# Patient Record
Sex: FEMALE | Race: WHITE | NOT HISPANIC OR LATINO | Employment: OTHER | URBAN - NONMETROPOLITAN AREA
[De-identification: names, ages, dates, MRNs, and addresses within clinical notes are randomized per-mention and may not be internally consistent; named-entity substitution may affect disease eponyms.]

---

## 2021-08-25 ENCOUNTER — OFFICE VISIT (OUTPATIENT)
Dept: CARDIOLOGY | Facility: CLINIC | Age: 71
End: 2021-08-25

## 2021-08-25 VITALS
TEMPERATURE: 96.9 F | WEIGHT: 248 LBS | HEART RATE: 70 BPM | OXYGEN SATURATION: 96 % | SYSTOLIC BLOOD PRESSURE: 150 MMHG | BODY MASS INDEX: 36.73 KG/M2 | HEIGHT: 69 IN | DIASTOLIC BLOOD PRESSURE: 79 MMHG

## 2021-08-25 DIAGNOSIS — R60.9 PERIPHERAL EDEMA: ICD-10-CM

## 2021-08-25 DIAGNOSIS — R06.02 SHORTNESS OF BREATH: ICD-10-CM

## 2021-08-25 DIAGNOSIS — R09.89 DECREASED PEDAL PULSES: ICD-10-CM

## 2021-08-25 DIAGNOSIS — I10 ESSENTIAL HYPERTENSION: ICD-10-CM

## 2021-08-25 DIAGNOSIS — I73.9 CLAUDICATION (HCC): ICD-10-CM

## 2021-08-25 DIAGNOSIS — R42 DIZZINESS: ICD-10-CM

## 2021-08-25 DIAGNOSIS — R00.2 PALPITATIONS: ICD-10-CM

## 2021-08-25 DIAGNOSIS — R07.89 OTHER CHEST PAIN: Primary | ICD-10-CM

## 2021-08-25 DIAGNOSIS — I25.119 CORONARY ARTERY DISEASE INVOLVING NATIVE CORONARY ARTERY OF NATIVE HEART WITH ANGINA PECTORIS (HCC): ICD-10-CM

## 2021-08-25 PROBLEM — J44.9 COPD (CHRONIC OBSTRUCTIVE PULMONARY DISEASE): Status: ACTIVE | Noted: 2021-08-25

## 2021-08-25 PROBLEM — R60.0 PERIPHERAL EDEMA: Status: ACTIVE | Noted: 2021-08-25

## 2021-08-25 PROBLEM — M19.90 ARTHRITIS: Status: ACTIVE | Noted: 2021-08-25

## 2021-08-25 PROBLEM — J30.2 SEASONAL ALLERGIES: Status: ACTIVE | Noted: 2021-08-25

## 2021-08-25 PROCEDURE — 99204 OFFICE O/P NEW MOD 45 MIN: CPT | Performed by: NURSE PRACTITIONER

## 2021-08-25 PROCEDURE — 93000 ELECTROCARDIOGRAM COMPLETE: CPT | Performed by: NURSE PRACTITIONER

## 2021-08-25 RX ORDER — ASPIRIN 81 MG/1
81 TABLET ORAL DAILY
Qty: 30 TABLET | Refills: 11 | Status: SHIPPED | OUTPATIENT
Start: 2021-08-25

## 2021-08-25 RX ORDER — ATORVASTATIN CALCIUM 20 MG/1
20 TABLET, FILM COATED ORAL DAILY
Qty: 90 TABLET | Refills: 3 | Status: SHIPPED | OUTPATIENT
Start: 2021-08-25 | End: 2021-08-25 | Stop reason: SDUPTHER

## 2021-08-25 RX ORDER — OMEPRAZOLE 20 MG/1
20 CAPSULE, DELAYED RELEASE ORAL DAILY
COMMUNITY

## 2021-08-25 RX ORDER — SPIRONOLACTONE 50 MG/1
50 TABLET, FILM COATED ORAL DAILY
COMMUNITY

## 2021-08-25 RX ORDER — ISOSORBIDE MONONITRATE 30 MG/1
30 TABLET, EXTENDED RELEASE ORAL DAILY
Qty: 30 TABLET | Refills: 11 | Status: SHIPPED | OUTPATIENT
Start: 2021-08-25 | End: 2021-08-25 | Stop reason: SDUPTHER

## 2021-08-25 RX ORDER — ATENOLOL 100 MG/1
100 TABLET ORAL DAILY
COMMUNITY

## 2021-08-25 RX ORDER — NITROGLYCERIN 0.4 MG/1
TABLET SUBLINGUAL
Qty: 30 TABLET | Refills: 5 | Status: SHIPPED | OUTPATIENT
Start: 2021-08-25

## 2021-08-25 RX ORDER — ISOSORBIDE MONONITRATE 30 MG/1
15 TABLET, EXTENDED RELEASE ORAL NIGHTLY
Qty: 30 TABLET | Refills: 11 | Status: SHIPPED | OUTPATIENT
Start: 2021-08-25

## 2021-08-25 RX ORDER — LISINOPRIL 40 MG/1
40 TABLET ORAL DAILY
COMMUNITY

## 2021-08-25 RX ORDER — ATORVASTATIN CALCIUM 20 MG/1
20 TABLET, FILM COATED ORAL DAILY
Qty: 30 TABLET | Refills: 11 | Status: SHIPPED | OUTPATIENT
Start: 2021-08-25

## 2021-08-25 RX ORDER — FUROSEMIDE 20 MG/1
20 TABLET ORAL 2 TIMES DAILY
COMMUNITY

## 2021-08-25 RX ORDER — ASPIRIN 81 MG/1
81 TABLET ORAL DAILY
Qty: 90 TABLET | Refills: 3 | Status: SHIPPED | OUTPATIENT
Start: 2021-08-25 | End: 2021-08-25 | Stop reason: SDUPTHER

## 2021-08-25 RX ORDER — NITROGLYCERIN 0.4 MG/1
TABLET SUBLINGUAL
Qty: 30 TABLET | Refills: 5 | Status: SHIPPED | OUTPATIENT
Start: 2021-08-25 | End: 2021-08-25 | Stop reason: SDUPTHER

## 2021-08-25 NOTE — PROGRESS NOTES
Subjective   Charu Michael is a 71 y.o. female     Chief Complaint   Patient presents with   • Establish Care       HPI    Problem list:    1.  CAD   1.1 [facility] 2/26/2015-2030% narrowing at the junction of its proximal middle thirds of the LAD, the diagonal with scintigraphically normal, right coronary artery with angiographically normal, circumflex had minor irregularities, EF 55%, LVEDP 12-14  2.  Hypertension  3.  Palpitations  4.  Claudication  5.  Shortness of breath  6.  COPD, O2 2 L nasal cannula 24/7, Dr. Terry  7.  Dizziness  8.  History of hiatal hernia with repair  9.  Family history of early CAD, Mom 57 MI    Patient is a 71-year-old female who presents today to establish care for chest pain with her  at her side.  Patient says she has what she describes as a sharp pain and then tightness and heaviness midsternum.  She says it does not radiate.  She says it can last for up to a couple hours and it is sporadic when it comes.  She says has been going on for very long time.  Patient will get short of breath, nauseated and lightheaded to the point where she feels like she is going to pass out whenever it occurs.  Patient says that she has fluttering she can just be sitting and it will start to race.  She gets it makes her have a nervous feeling.  She says that she does have dizziness and lightheadedness if she gets up too quickly or if she has been standing for a few minutes.  She denies any syncope, orthopnea or PND.  She does have swelling in her legs and feels like her water pills do not take it all the way.  Patient is shortness of breath she said since January is gotten worse.  She says that she does use oxygen 24/7 and she has for 6 to 7 years.  She says however ever since January she has been unable to do her housework without having to stop and rest.  She says she will get very short of breath and fatigue this is new for her.  Patient did have blood work done at [facility] and her  troponin was negative.  This was on Aug 23.    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    Current Outpatient Medications on File Prior to Visit   Medication Sig Dispense Refill   • atenolol (TENORMIN) 100 MG tablet Take 100 mg by mouth Daily.     • fluticasone-salmeterol (ADVAIR) 250-50 MCG/DOSE DISKUS Inhale 2 puffs 2 (Two) Times a Day.     • furosemide (LASIX) 20 MG tablet Take 20 mg by mouth 2 (Two) Times a Day.     • lisinopril (PRINIVIL,ZESTRIL) 40 MG tablet Take 40 mg by mouth Daily.     • omeprazole (priLOSEC) 20 MG capsule Take 20 mg by mouth Daily.     • spironolactone (ALDACTONE) 50 MG tablet Take 50 mg by mouth Daily.       No current facility-administered medications on file prior to visit.       ALLERGIES    Norco [hydrocodone-acetaminophen]    Past Medical History:   Diagnosis Date   • Acid reflux    • Arthritis    • Asthma    • COPD (chronic obstructive pulmonary disease) (CMS/formerly [facility])    • COVID-19 vaccine administered 2021    2nd- 2021 - Moderna    • Hypertension    • Tachycardia        Social History     Socioeconomic History   • Marital status:      Spouse name: Not on file   • Number of children: Not on file   • Years of education: Not on file   • Highest education level: Not on file   Tobacco Use   • Smoking status: Former Smoker     Packs/day: 1.00     Years: 20.00     Pack years: 20.00     Types:  Cigarettes     Quit date: 3/25/2013     Years since quittin.4   • Smokeless tobacco: Never Used   Substance and Sexual Activity   • Alcohol use: Never   • Drug use: Defer   • Sexual activity: Defer       Family History   Problem Relation Age of Onset   • Heart attack Mother    • Hypertension Mother    • Hyperlipidemia Mother    • Heart attack Father    • Hypertension Father    • Hyperlipidemia Father        Review of Systems   Constitutional: Positive for fatigue (tired all the time). Negative for appetite change, chills, diaphoresis and fever.   HENT: Positive for congestion (nasal due to allergies ) and rhinorrhea (clear at times due to allergies ). Negative for sore throat.    Eyes: Positive for visual disturbance (glasses ( reading) ).   Respiratory: Positive for chest tightness (Center of the chest ( tight or feels heavy ) at times when she is cleaning and moving around ), shortness of breath (walking at any distance even with O2, worse since , could walk around and clean house and now she has to do a section adn stop, etc; with CP  ) and wheezing (worse at night she cant lay flat down she has to be elevated up ). Negative for cough.    Cardiovascular: Positive for chest pain (sharp pain )/tightness/heaviness mid no rad; up to couple of hours;  sporadic, can occur at anytime; been going on for a very long time , palpitations (fluttering; just sitting at times and will start racing, makes her feel nervous ) and leg swelling (legs, feet , and ankles , the swelling does not go down at night; water pills do not work ).   Gastrointestinal: Positive for constipation (when she takes certain medication ), diarrhea (Chronic ( it just happens ) all the time ) and nausea (with CP ). Negative for abdominal pain, blood in stool and vomiting.   Endocrine: Positive for cold intolerance (cold sweats ) and heat intolerance (Night sweats and Hot flahses ).   Genitourinary: Negative for difficulty urinating, dysuria,  "frequency, hematuria and urgency.   Musculoskeletal: Positive for arthralgias (through out he rentire body ), back pain (lower ), gait problem (wheelchair ), joint swelling (ankles and knees ) and neck pain (back of her neck and will run down her shoulders ). Negative for neck stiffness.   Skin: Negative for color change, pallor, rash and wound.   Allergic/Immunologic: Positive for environmental allergies (Seasonal ). Negative for food allergies.   Neurological: Positive for dizziness (she can be walking and sitting and she will have dizzy spells; on occasion with position change ), weakness (weakness all over her body , her legs start giving out when she walks for a few mins ), light-headedness (when she gets up to quick she has to stand still for a few mins and get her balance; will feel like she is going to pass out when she has CP on occasion ), numbness (legs, and feet stay numb ) and headaches (H/O of Headaches ).   Hematological: Does not bruise/bleed easily.   Psychiatric/Behavioral: Positive for sleep disturbance (Hard to go and hard to stay asleep , she tosses and turns and she can not lay flat she has to be elevated to get rest she gets maybe 3-4 hrs a night ).       Objective   /79 (BP Location: Right arm)   Pulse 70   Temp 96.9 °F (36.1 °C)   Ht 175.3 cm (69\")   Wt 112 kg (248 lb)   SpO2 96% Comment: 2 lts  BMI 36.62 kg/m²   Vitals:    08/25/21 0938   BP: 150/79   BP Location: Right arm   Pulse: 70   Temp: 96.9 °F (36.1 °C)   SpO2: 96%   Weight: 112 kg (248 lb)   Height: 175.3 cm (69\")      Lab Results (most recent)     None        Physical Exam  Vitals reviewed.   Constitutional:       General: She is awake.      Appearance: She is well-developed and well-groomed. She is obese. She is ill-appearing.   HENT:      Head: Normocephalic.   Eyes:      General: Lids are normal.   Neck:      Vascular: No carotid bruit, hepatojugular reflux or JVD.   Cardiovascular:      Rate and Rhythm: Normal rate " and regular rhythm.      Pulses:           Radial pulses are 2+ on the right side and 2+ on the left side.        Dorsalis pedis pulses are 2+ on the right side.        Posterior tibial pulses are 2+ on the right side.      Heart sounds: Normal heart sounds.      Comments: LLE decreased pedal pulses   Pulmonary:      Effort: Pulmonary effort is normal.      Breath sounds: Normal breath sounds and air entry.      Comments: O2 2L NC  Abdominal:      General: Bowel sounds are normal.      Palpations: Abdomen is soft.   Musculoskeletal:      Right lower leg: No edema.      Left lower leg: No edema.      Comments: Wheelchair   Skin:     General: Skin is warm and dry.   Neurological:      Mental Status: She is alert and oriented to person, place, and time.   Psychiatric:         Attention and Perception: Attention and perception normal.         Mood and Affect: Mood and affect normal.         Speech: Speech normal.         Behavior: Behavior normal. Behavior is cooperative.         Thought Content: Thought content normal.         Cognition and Memory: Cognition and memory normal.         Judgment: Judgment normal.         Procedure     ECG 12 Lead    Date/Time: 8/25/2021 12:01 PM  Performed by: Radha Henderson APRN  Authorized by: Radha Henderson APRN   Comparison: not compared with previous ECG   Rhythm: sinus rhythm  Rate: normal  BPM: 73  QRS axis: normal  Other findings: non-specific ST-T wave changes    Clinical impression: non-specific ECG                 Assessment/Plan      Diagnosis Plan   1. Other chest pain  Stress Test With Myocardial Perfusion One Day    Adult Transthoracic Echo Complete W/ Cont if Necessary Per Protocol    isosorbide mononitrate (IMDUR) 30 MG 24 hr tablet    nitroglycerin (NITROSTAT) 0.4 MG SL tablet    ECG 12 Lead   2. Coronary artery disease involving native coronary artery of native heart with angina pectoris (CMS/HCC)  Stress Test With Myocardial Perfusion One Day    Adult  Transthoracic Echo Complete W/ Cont if Necessary Per Protocol    atorvastatin (LIPITOR) 20 MG tablet    aspirin (aspirin) 81 MG EC tablet    ECG 12 Lead   3. Essential hypertension  Stress Test With Myocardial Perfusion One Day    Adult Transthoracic Echo Complete W/ Cont if Necessary Per Protocol    ECG 12 Lead   4. Palpitations  Stress Test With Myocardial Perfusion One Day    Adult Transthoracic Echo Complete W/ Cont if Necessary Per Protocol    Cardiac Event Monitor    ECG 12 Lead   5. Peripheral edema  Adult Transthoracic Echo Complete W/ Cont if Necessary Per Protocol   6. Dizziness  Cardiac Event Monitor    Duplex Carotid Ultrasound CAR   7. Shortness of breath  Stress Test With Myocardial Perfusion One Day    Adult Transthoracic Echo Complete W/ Cont if Necessary Per Protocol   8. Claudication (CMS/HCC)  Duplex Lower Extremity Art / Grafts - Bilateral CAR   9. Decreased pedal pulses  Duplex Lower Extremity Art / Grafts - Bilateral CAR       Return in about 12 weeks (around 11/17/2021).    Chest pain/CAD/hypertension/palpitation/peripheral edema/shortness of breath-patient have an ischemia work-up, stress and echo.  She will start Imdur a half a tab a day.  She will start aspirin 81.  She will start Lipitor.  She will use nitroglycerin as needed for chest pain no resolution she will go to the ER.  Dizziness-patient will carotid ultrasound.  Palpitations/dizziness-patient wore an event monitor for 2 weeks.  Claudication/decreased pedal pulses-patient on bilateral x-ray arterial ultrasound.  She will continue her medication regimen with above-noted changes.  She will follow-up in 12 weeks or sooner if any changes or abnormalities with testing.    I have requested that her stress test be done as soon as possible.       Charu Michael  reports that she quit smoking about 8 years ago. Her smoking use included cigarettes. She has a 20.00 pack-year smoking history. She has never used smokeless tobacco..Advance Care  Planning   ACP discussion was declined by the patient. Patient does not have an advance directive, declines further assistance. Patient brought in medicine list to appointment, it's been reviewed with patient and med list was updated in the chart.     Electronically signed by:

## 2021-08-25 NOTE — PATIENT INSTRUCTIONS
Advance Care Planning and Advance Directives     You make decisions on a daily basis - decisions about where you want to live, your career, your home, your life. Perhaps one of the most important decisions you face is your choice for future medical care. Take time to talk with your family and your healthcare team and start planning today.  Advance Care Planning is a process that can help you:  · Understand possible future healthcare decisions in light of your own experiences  · Reflect on those decision in light of your goals and values  · Discuss your decisions with those closest to you and the healthcare professionals that care for you  · Make a plan by creating a document that reflects your wishes    Surrogate Decision Maker  In the event of a medical emergency, which has left you unable to communicate or to make your own decisions, you would need someone to make decisions for you.  It is important to discuss your preferences for medical treatment with this person while you are in good health.     Qualities of a surrogate decision maker:  • Willing to take on this role and responsibility  • Knows what you want for future medical care  • Willing to follow your wishes even if they don't agree with them  • Able to make difficult medical decisions under stressful circumstances    Advance Directives  These are legal documents you can create that will guide your healthcare team and decision maker(s) when needed. These documents can be stored in the electronic medical record.    · Living Will - a legal document to guide your care if you have a terminal condition or a serious illness and are unable to communicate. States vary by statute in document names/types, but most forms may include one or more of the following:        -  Directions regarding life-prolonging treatments        -  Directions regarding artificially provided nutrition/hydration        -  Choosing a healthcare decision maker        -  Direction  regarding organ/tissue donation    · Durable Power of  for Healthcare - this document names an -in-fact to make medical decisions for you, but it may also allow this person to make personal and financial decisions for you. Please seek the advice of an  if you need this type of document.    **Advance Directives are not required and no one may discriminate against you if you do not sign one.    Medical Orders  Many states allow specific forms/orders signed by your physician to record your wishes for medical treatment in your current state of health. This form, signed in personal communication with your physician, addresses resuscitation and other medical interventions that you may or may not want.        Coronary Artery Disease, Female  Coronary artery disease (CAD) is a condition in which the arteries that lead to the heart (coronary arteries) become narrow or blocked. The narrowing or blockage can lead to decreased blood flow to the heart. Prolonged reduced blood flow can cause a heart attack (myocardial infarction or MI). This condition may also be called coronary heart disease.  Because CAD is the leading cause of death in women, it is important to understand what causes this condition and how it is treated.  What are the causes?  CAD is most often caused by atherosclerosis. This is the buildup of fat and cholesterol (plaque) on the inside of the arteries. Over time, the plaque may narrow or block the artery, reducing blood flow to the heart. Plaque can also become weak and break off within a coronary artery and cause a sudden blockage. Other less common causes of CAD include:  · A blood clot or a piece of a blood clot or other substance that blocks the flow of blood in a coronary artery (embolism).  · A tearing of the artery (spontaneous coronary artery dissection).  · An enlargement of an artery (aneurysm).  · Inflammation (vasculitis) in the artery wall.  What increases the risk?  The  following factors may make you more likely to develop this condition:  · Age. Women over age 55 are at a greater risk of CAD.  · Family history of CAD.  · High blood pressure (hypertension).  · Diabetes.  · High cholesterol levels.  · Tobacco use.  · Lack of exercise.  · Menopause.  ? All postmenopausal women are at greater risk of CAD.  ? Women who have experienced menopause between the ages of 40-45 (early menopause) are at a higher risk of CAD.  ? Women who have experienced menopause before age 40 (premature menopause) are at a very high risk of CAD.  · Excessive alcohol use.  · A diet high in saturated and trans fats, such as fried food and processed meat.  Other possible risk factors include:  · High stress levels.  · Depression.  · Obesity.  · Sleep apnea.  What are the signs or symptoms?  Many people do not have any symptoms during the early stages of CAD. As the condition progresses, symptoms may include:  · Chest pain (angina). The pain can:  ? Feel like crushing or squeezing, or like a tightness, pressure, fullness, or heaviness in the chest.  ? Last more than a few minutes or can stop and recur. The pain tends to get worse with exercise or stress and to fade with rest.  · Pain in the arms, neck, jaw, ear, or back.  · Unexplained heartburn or indigestion.  · Shortness of breath.  · Nausea.  · Sudden cold sweats.  · Sudden light-headedness.  · Fluttering or fast heartbeat (palpitations).  Many women have chest discomfort and the other symptoms. However, women often have unusual (atypical) symptoms, such as:  · Fatigue.  · Vomiting.  · Unexplained feelings of nervousness or anxiety.  · Unexplained weakness.  · Dizziness or fainting.  How is this diagnosed?  This condition is diagnosed based on:  · Your family and medical history.  · A physical exam.  · Tests, including:  ? A test to check the electrical signals in your heart (electrocardiogram).  ? Exercise stress test. This looks for signs of blockage when  the heart is stressed with exercise, such as running on a treadmill.  ? Pharmacologic stress test. This test looks for signs of blockage when the heart is being stressed with a medicine.  ? Blood tests.  ? Coronary angiogram. This is a procedure to look at the coronary arteries to see if there is any blockage. During this test, a dye is injected into your arteries so they appear on an X-ray.  ? Coronary artery CT scan. This CT scan helps detect calcium deposits in your coronary arteries. Calcium deposits are an indicator of CAD.  ? A test that uses sound waves to take a picture of your heart (echocardiogram).  ? Chest X-ray.  How is this treated?  This condition may be treated by:  · Healthy lifestyle changes to reduce risk factors.  · Medicines such as:  ? Antiplatelet medicines and blood-thinning medicines, such as aspirin. These help to prevent blood clots.  ? Nitroglycerin.  ? Blood pressure medicines.  ? Cholesterol-lowering medicine.  · Coronary angioplasty and stenting. During this procedure, a thin, flexible tube is inserted through a blood vessel and into a blocked artery. A balloon or similar device on the end of the tube is inflated to open up the artery. In some cases, a small, mesh tube (stent) is inserted into the artery to keep it open.  · Coronary artery bypass surgery. During this surgery, veins or arteries from other parts of the body are used to create a bypass around the blockage and allow blood to reach your heart.  Follow these instructions at home:  Medicines  · Take over-the-counter and prescription medicines only as told by your health care provider.  · Do not take the following medicines unless your health care provider approves:  ? NSAIDs, such as ibuprofen, naproxen, or celecoxib.  ? Vitamin supplements that contain vitamin A, vitamin E, or both.  ? Hormone replacement therapy that contains estrogen with or without progestin.  Lifestyle  · Follow an exercise program approved by your  health care provider. Aim for 150 minutes of moderate exercise or 75 minutes of vigorous exercise each week.  · Maintain a healthy weight or lose weight as approved by your health care provider.  · Learn to manage stress or try to limit your stress. Ask your health care provider for suggestions if you need help.  · Get screened for depression and seek treatment, if needed.  · Do not use any products that contain nicotine or tobacco, such as cigarettes, e-cigarettes, and chewing tobacco. If you need help quitting, ask your health care provider.  · Do not use illegal drugs.  Eating and drinking    · Follow a heart-healthy diet. A dietitian can help educate you about healthy food options and changes. In general, eat plenty of fruits and vegetables, lean meats, and whole grains.  · Avoid foods high in:  ? Sugar.  ? Salt (sodium).  ? Saturated fats, such as processed or fatty meat.  ? Trans fats, such as fried food.  · Use healthy cooking methods such as roasting, grilling, broiling, baking, poaching, steaming, or stir-frying.  · Do not drink alcohol if:  ? Your health care provider tells you not to drink.  ? You are pregnant, may be pregnant, or are planning to become pregnant.  · If you drink alcohol:  ? Limit how much you have to 0-1 drink a day.  ? Be aware of how much alcohol is in your drink. In the [place] one drink equals one 12 oz bottle of beer (355 mL), one 5 oz glass of wine (148 mL), or one 1½ oz glass of hard liquor (44 mL).  General instructions  · Manage any other health conditions, such as hypertension and diabetes. These conditions affect your heart.  · Your health care provider may ask you to monitor your blood pressure. Ideally, your blood pressure should be below 130/80.  · Keep all follow-up visits as told by your health care provider. This is important.  Get help right away if:  · You have pain in your chest, neck, ear, arm, jaw, stomach, or back that:  ? Lasts more than a few minutes.  ? Is  recurring.  ? Is not relieved by taking medicine under your tongue (sublingual nitroglycerin).  · You have profuse sweating without cause.  · You have unexplained:  ? Heartburn or indigestion.  ? Shortness of breath or difficulty breathing.  ? Fluttering or fast heartbeat (palpitations).  ? Nausea or vomiting.  ? Fatigue.  ? Feelings of nervousness or anxiety.  ? Weakness.  ? Diarrhea.  · You have sudden light-headedness or dizziness.  · You faint.  · You feel like hurting yourself or think about taking your own life.  These symptoms may represent a serious problem that is an emergency. Do not wait to see if the symptoms will go away. Get medical help right away. Call your local emergency services (911 in the [place] Do not drive yourself to the hospital.  Summary  · Coronary artery disease (CAD) is a condition in which the arteries that lead to the heart (coronary arteries) become narrow or blocked. The narrowing or blockage can lead to a heart attack.  · Many women have chest discomfort and other common symptoms of CAD. However, women often have unusual (atypical) symptoms, such as fatigue, vomiting, weakness, or dizziness.  · CAD can be treated with lifestyle changes, medicines, surgery, or a combination of these treatments.  This information is not intended to replace advice given to you by your health care provider. Make sure you discuss any questions you have with your health care provider.  Document Revised: 09/06/2019 Document Reviewed: 08/27/2019  "[employer] Patient Education © 2021 "[employer] Inc.    Nonspecific Chest Pain  Chest pain can be caused by many different conditions. Some causes of chest pain can be life-threatening. These will require treatment right away. Serious causes of chest pain include:  · Heart attack.  · A tear in the body's main blood vessel.  · Redness and swelling (inflammation) around your heart.  · Blood clot in your lungs.  Other causes of chest pain may not be so serious. These  include:  · Heartburn.  · Anxiety or stress.  · Damage to bones or muscles in your chest.  · Lung infections.  Chest pain can feel like:  · Pain or discomfort in your chest.  · Crushing, pressure, aching, or squeezing pain.  · Burning or tingling.  · Dull or sharp pain that is worse when you move, cough, or take a deep breath.  · Pain or discomfort that is also felt in your back, neck, jaw, shoulder, or arm, or pain that spreads to any of these areas.  It is hard to know whether your pain is caused by something that is serious or something that is not so serious. So it is important to see your doctor right away if you have chest pain.  Follow these instructions at home:  Medicines  · Take over-the-counter and prescription medicines only as told by your doctor.  · If you were prescribed an antibiotic medicine, take it as told by your doctor. Do not stop taking the antibiotic even if you start to feel better.  Lifestyle    · Rest as told by your doctor.  · Do not use any products that contain nicotine or tobacco, such as cigarettes, e-cigarettes, and chewing tobacco. If you need help quitting, ask your doctor.  · Do not drink alcohol.  · Make lifestyle changes as told by your doctor. These may include:  ? Getting regular exercise. Ask your doctor what activities are safe for you.  ? Eating a heart-healthy diet. A diet and nutrition specialist (dietitian) can help you to learn healthy eating options.  ? Staying at a healthy weight.  ? Treating diabetes or high blood pressure, if needed.  ? Lowering your stress. Activities such as yoga and relaxation techniques can help.  General instructions  · Pay attention to any changes in your symptoms. Tell your doctor about them or any new symptoms.  · Avoid any activities that cause chest pain.  · Keep all follow-up visits as told by your doctor. This is important. You may need more testing if your chest pain does not go away.  Contact a doctor if:  · Your chest pain does not go  away.  · You feel depressed.  · You have a fever.  Get help right away if:  · Your chest pain is worse.  · You have a cough that gets worse, or you cough up blood.  · You have very bad (severe) pain in your belly (abdomen).  · You pass out (faint).  · You have either of these for no clear reason:  ? Sudden chest discomfort.  ? Sudden discomfort in your arms, back, neck, or jaw.  · You have shortness of breath at any time.  · You suddenly start to sweat, or your skin gets clammy.  · You feel sick to your stomach (nauseous).  · You throw up (vomit).  · You suddenly feel lightheaded or dizzy.  · You feel very weak or tired.  · Your heart starts to beat fast, or it feels like it is skipping beats.  These symptoms may be an emergency. Do not wait to see if the symptoms will go away. Get medical help right away. Call your local emergency services (911 in the [place] Do not drive yourself to the hospital.  Summary  · Chest pain can be caused by many different conditions. The cause may be serious and need treatment right away. If you have chest pain, see your doctor right away.  · Follow your doctor's instructions for taking medicines and making lifestyle changes.  · Keep all follow-up visits as told by your doctor. This includes visits for any further testing if your chest pain does not go away.  · Be sure to know the signs that show that your condition has become worse. Get help right away if you have these symptoms.  This information is not intended to replace advice given to you by your health care provider. Make sure you discuss any questions you have with your health care provider.  Document Revised: 06/20/2019 Document Reviewed: 06/20/2019  [employer] Patient Education © 2021 [employer]  For more information or to schedule a time with a [facility] Advance Care Planning Facilitator contact: [email or web address] or call [phone] and someone will contact you directly.

## 2021-08-26 ENCOUNTER — HOSPITAL ENCOUNTER (OUTPATIENT)
Dept: CARDIOLOGY | Facility: HOSPITAL | Age: 71
Discharge: HOME | End: 2021-08-26

## 2021-08-26 ENCOUNTER — TELEPHONE (OUTPATIENT)
Dept: CARDIOLOGY | Facility: CLINIC | Age: 71
End: 2021-08-26

## 2021-08-26 VITALS — WEIGHT: 246.91 LBS | HEIGHT: 69 IN | BODY MASS INDEX: 36.57 KG/M2

## 2021-08-26 DIAGNOSIS — I25.119 CORONARY ARTERY DISEASE INVOLVING NATIVE CORONARY ARTERY OF NATIVE HEART WITH ANGINA PECTORIS (HCC): ICD-10-CM

## 2021-08-26 DIAGNOSIS — R07.89 OTHER CHEST PAIN: ICD-10-CM

## 2021-08-26 DIAGNOSIS — R60.9 PERIPHERAL EDEMA: ICD-10-CM

## 2021-08-26 DIAGNOSIS — R00.2 PALPITATIONS: ICD-10-CM

## 2021-08-26 DIAGNOSIS — R06.02 SHORTNESS OF BREATH: ICD-10-CM

## 2021-08-26 DIAGNOSIS — I10 ESSENTIAL HYPERTENSION: ICD-10-CM

## 2021-08-26 PROCEDURE — 78452 HT MUSCLE IMAGE SPECT MULT: CPT | Performed by: INTERNAL MEDICINE

## 2021-08-26 PROCEDURE — A9500 TC99M SESTAMIBI: HCPCS | Performed by: INTERNAL MEDICINE

## 2021-08-26 PROCEDURE — 0 TECHNETIUM SESTAMIBI: Performed by: INTERNAL MEDICINE

## 2021-08-26 PROCEDURE — 93017 CV STRESS TEST TRACING ONLY: CPT

## 2021-08-26 PROCEDURE — 93306 TTE W/DOPPLER COMPLETE: CPT | Performed by: INTERNAL MEDICINE

## 2021-08-26 PROCEDURE — 93018 CV STRESS TEST I&R ONLY: CPT | Performed by: INTERNAL MEDICINE

## 2021-08-26 PROCEDURE — 78452 HT MUSCLE IMAGE SPECT MULT: CPT

## 2021-08-26 PROCEDURE — 25010000002 DOBUTAMINE PER 250 MG: Performed by: INTERNAL MEDICINE

## 2021-08-26 PROCEDURE — 93306 TTE W/DOPPLER COMPLETE: CPT

## 2021-08-26 RX ORDER — DOBUTAMINE HYDROCHLORIDE 200 MG/100ML
10 INJECTION INTRAVENOUS
Status: COMPLETED | OUTPATIENT
Start: 2021-08-26 | End: 2021-08-26

## 2021-08-26 RX ADMIN — TECHNETIUM TC 99M SESTAMIBI 1 DOSE: 1 INJECTION INTRAVENOUS at 09:45

## 2021-08-26 RX ADMIN — DOBUTAMINE HYDROCHLORIDE 10 MCG/KG/MIN: 200 INJECTION INTRAVENOUS at 11:40

## 2021-08-26 RX ADMIN — TECHNETIUM TC 99M SESTAMIBI 1 DOSE: 1 INJECTION INTRAVENOUS at 11:41

## 2021-08-28 LAB
BH CV REST NUCLEAR ISOTOPE DOSE: 10 MCI
BH CV STRESS DOSE DOBUTAMINE STAGE 1: 10
BH CV STRESS DURATION MIN STAGE 1: 2
BH CV STRESS DURATION SEC STAGE 1: 0
BH CV STRESS NUCLEAR ISOTOPE DOSE: 30 MCI
BH CV STRESS PROTOCOL 1: NORMAL
BH CV STRESS RECOVERY BP: NORMAL MMHG
BH CV STRESS RECOVERY HR: 88 BPM
BH CV STRESS STAGE 1: 1
MAXIMAL PREDICTED HEART RATE: 150 BPM
PERCENT MAX PREDICTED HR: 82.67 %
STRESS BASELINE BP: NORMAL MMHG
STRESS BASELINE HR: 61 BPM
STRESS PERCENT HR: 97 %
STRESS POST PEAK BP: NORMAL MMHG
STRESS POST PEAK HR: 124 BPM
STRESS TARGET HR: 128 BPM

## 2021-08-29 LAB
AORTIC DIMENSIONLESS INDEX: 0.7 (DI)
BH CV ECHO MEAS - AO MAX PG (FULL): 4.8 MMHG
BH CV ECHO MEAS - AO MAX PG: 8.5 MMHG
BH CV ECHO MEAS - AO MEAN PG (FULL): 2 MMHG
BH CV ECHO MEAS - AO MEAN PG: 4 MMHG
BH CV ECHO MEAS - AO ROOT AREA (BSA CORRECTED): 1.4
BH CV ECHO MEAS - AO ROOT AREA: 8 CM^2
BH CV ECHO MEAS - AO ROOT DIAM: 3.2 CM
BH CV ECHO MEAS - AO V2 MAX: 146 CM/SEC
BH CV ECHO MEAS - AO V2 MEAN: 92.2 CM/SEC
BH CV ECHO MEAS - AO V2 VTI: 35.7 CM
BH CV ECHO MEAS - BSA(HAYCOCK): 2.4 M^2
BH CV ECHO MEAS - BSA: 2.3 M^2
BH CV ECHO MEAS - BZI_BMI: 36.6 KILOGRAMS/M^2
BH CV ECHO MEAS - BZI_METRIC_HEIGHT: 175.3 CM
BH CV ECHO MEAS - BZI_METRIC_WEIGHT: 112.5 KG
BH CV ECHO MEAS - EDV(CUBED): 128 ML
BH CV ECHO MEAS - EDV(TEICH): 120.5 ML
BH CV ECHO MEAS - EF(CUBED): 58.5 %
BH CV ECHO MEAS - EF(MOD-BP): 50 %
BH CV ECHO MEAS - EF(TEICH): 49.9 %
BH CV ECHO MEAS - EF_3D-VOL: 52 %
BH CV ECHO MEAS - ESV(CUBED): 53.2 ML
BH CV ECHO MEAS - ESV(TEICH): 60.4 ML
BH CV ECHO MEAS - FS: 25.4 %
BH CV ECHO MEAS - IVS/LVPW: 0.95
BH CV ECHO MEAS - IVSD: 1.1 CM
BH CV ECHO MEAS - LA DIMENSION: 4.3 CM
BH CV ECHO MEAS - LA/AO: 1.3
BH CV ECHO MEAS - LAT PEAK E' VEL: 5.2 CM/SEC
BH CV ECHO MEAS - LV IVRT: 0.13 SEC
BH CV ECHO MEAS - LV MASS(C)D: 203.3 GRAMS
BH CV ECHO MEAS - LV MASS(C)DI: 89.8 GRAMS/M^2
BH CV ECHO MEAS - LV MAX PG: 3.7 MMHG
BH CV ECHO MEAS - LV MEAN PG: 2 MMHG
BH CV ECHO MEAS - LV V1 MAX: 96.6 CM/SEC
BH CV ECHO MEAS - LV V1 MEAN: 58.9 CM/SEC
BH CV ECHO MEAS - LV V1 VTI: 23.8 CM
BH CV ECHO MEAS - LVIDD: 5 CM
BH CV ECHO MEAS - LVIDS: 3.8 CM
BH CV ECHO MEAS - LVPWD: 1.1 CM
BH CV ECHO MEAS - MED PEAK E' VEL: 4.8 CM/SEC
BH CV ECHO MEAS - MV A MAX VEL: 111 CM/SEC
BH CV ECHO MEAS - MV DEC SLOPE: 563 CM/SEC^2
BH CV ECHO MEAS - MV DEC TIME: 0.21 SEC
BH CV ECHO MEAS - MV E MAX VEL: 82 CM/SEC
BH CV ECHO MEAS - MV E/A: 0.74
BH CV ECHO MEAS - MV MAX PG: 5.3 MMHG
BH CV ECHO MEAS - MV MEAN PG: 3 MMHG
BH CV ECHO MEAS - MV P1/2T MAX VEL: 115 CM/SEC
BH CV ECHO MEAS - MV P1/2T: 59.8 MSEC
BH CV ECHO MEAS - MV V2 MAX: 115 CM/SEC
BH CV ECHO MEAS - MV V2 MEAN: 73.8 CM/SEC
BH CV ECHO MEAS - MV V2 VTI: 47.2 CM
BH CV ECHO MEAS - MVA P1/2T LCG: 1.9 CM^2
BH CV ECHO MEAS - MVA(P1/2T): 3.7 CM^2
BH CV ECHO MEAS - PA MAX PG (FULL): 1.3 MMHG
BH CV ECHO MEAS - PA MAX PG: 2.8 MMHG
BH CV ECHO MEAS - PA MEAN PG (FULL): 1 MMHG
BH CV ECHO MEAS - PA MEAN PG: 2 MMHG
BH CV ECHO MEAS - PA V2 MAX: 83.2 CM/SEC
BH CV ECHO MEAS - PA V2 MEAN: 57 CM/SEC
BH CV ECHO MEAS - PA V2 VTI: 26.6 CM
BH CV ECHO MEAS - PI END-D VEL: 89.8 CM/SEC
BH CV ECHO MEAS - RV MAX PG: 1.5 MMHG
BH CV ECHO MEAS - RV MEAN PG: 1 MMHG
BH CV ECHO MEAS - RV V1 MAX: 61.4 CM/SEC
BH CV ECHO MEAS - RV V1 MEAN: 34.4 CM/SEC
BH CV ECHO MEAS - RV V1 VTI: 14 CM
BH CV ECHO MEAS - RVDD: 3.4 CM
BH CV ECHO MEAS - SI(AO): 126.9 ML/M^2
BH CV ECHO MEAS - SI(CUBED): 33.1 ML/M^2
BH CV ECHO MEAS - SI(TEICH): 26.5 ML/M^2
BH CV ECHO MEAS - SV(AO): 287.1 ML
BH CV ECHO MEAS - SV(CUBED): 74.9 ML
BH CV ECHO MEAS - SV(TEICH): 60 ML
BH CV ECHO MEASUREMENTS AVERAGE E/E' RATIO: 16.4
MAXIMAL PREDICTED HEART RATE: 150 BPM
STRESS TARGET HR: 128 BPM

## 2021-08-30 ENCOUNTER — TELEPHONE (OUTPATIENT)
Dept: CARDIOLOGY | Facility: CLINIC | Age: 71
End: 2021-08-30

## 2021-08-30 NOTE — TELEPHONE ENCOUNTER
Stress:  1.  The patient was infused with dobutamine to heart rate of 124, systolic blood pressure of 210, a rate-pressure product of nearly 22,000.  The patient achieved 83% of age adjusted maximum predicted heart rate.     2.  No EKG evidence of ischemia.  No exercise-induced dysrhythmia.     3.  Poor quality scintigraphic images demonstrate no evidence of ischemia.     4.  Mildly depressed post-rest ejection fraction of 48% with mild global hypokinesis.     5.  No evidence of pharmacologically induced transient ischemic dilation or of increased lung uptake of radiopharmaceutical.      Scheduled pt for 9/2 at 3pm.       Called and informed pt of abn results and of appt opening on 9/2 at 3pm, she verbalized understanding and stated she would be here.

## 2021-08-30 NOTE — TELEPHONE ENCOUNTER
----- Message from RIGO Reich sent at 8/29/2021  3:22 PM EDT -----  Get patient in within next 2-4 weeks.

## 2021-08-30 NOTE — TELEPHONE ENCOUNTER
----- Message from RIGO Reich sent at 8/30/2021  6:19 AM EDT -----  Please advise patient.        PT was advised of Echo results :    ejection fraction is greater than or equal to 50%.  3D ejection fraction is 52%.     MYA Rodríguez

## 2021-09-02 ENCOUNTER — LAB (OUTPATIENT)
Dept: CARDIOLOGY | Facility: CLINIC | Age: 71
End: 2021-09-02

## 2021-09-02 ENCOUNTER — OFFICE VISIT (OUTPATIENT)
Dept: CARDIOLOGY | Facility: CLINIC | Age: 71
End: 2021-09-02

## 2021-09-02 VITALS
SYSTOLIC BLOOD PRESSURE: 148 MMHG | DIASTOLIC BLOOD PRESSURE: 68 MMHG | BODY MASS INDEX: 36.43 KG/M2 | WEIGHT: 246 LBS | HEIGHT: 69 IN | TEMPERATURE: 97.6 F | HEART RATE: 60 BPM | OXYGEN SATURATION: 95 %

## 2021-09-02 DIAGNOSIS — I49.3 PVC (PREMATURE VENTRICULAR CONTRACTION): ICD-10-CM

## 2021-09-02 DIAGNOSIS — R60.9 PERIPHERAL EDEMA: ICD-10-CM

## 2021-09-02 DIAGNOSIS — I51.89 GRADE I DIASTOLIC DYSFUNCTION: ICD-10-CM

## 2021-09-02 DIAGNOSIS — Z82.49 FAMILY HISTORY OF EARLY CAD: ICD-10-CM

## 2021-09-02 DIAGNOSIS — I10 ESSENTIAL HYPERTENSION: ICD-10-CM

## 2021-09-02 DIAGNOSIS — R07.89 OTHER CHEST PAIN: Primary | ICD-10-CM

## 2021-09-02 DIAGNOSIS — I49.1 PREMATURE ATRIAL COMPLEXES: ICD-10-CM

## 2021-09-02 DIAGNOSIS — I47.29 NSVT (NONSUSTAINED VENTRICULAR TACHYCARDIA) (HCC): ICD-10-CM

## 2021-09-02 DIAGNOSIS — I25.119 CORONARY ARTERY DISEASE INVOLVING NATIVE CORONARY ARTERY OF NATIVE HEART WITH ANGINA PECTORIS (HCC): ICD-10-CM

## 2021-09-02 DIAGNOSIS — R06.02 SHORTNESS OF BREATH: ICD-10-CM

## 2021-09-02 LAB
ANION GAP SERPL CALCULATED.3IONS-SCNC: 9.2 MMOL/L (ref 5–15)
BUN SERPL-MCNC: 11 MG/DL (ref 8–23)
BUN/CREAT SERPL: 14.7 (ref 7–25)
CALCIUM SPEC-SCNC: 9.4 MG/DL (ref 8.6–10.5)
CHLORIDE SERPL-SCNC: 99 MMOL/L (ref 98–107)
CO2 SERPL-SCNC: 33.8 MMOL/L (ref 22–29)
CREAT SERPL-MCNC: 0.75 MG/DL (ref 0.57–1)
DEPRECATED RDW RBC AUTO: 44.8 FL (ref 37–54)
ERYTHROCYTE [DISTWIDTH] IN BLOOD BY AUTOMATED COUNT: 12.8 % (ref 12.3–15.4)
GFR SERPL CREATININE-BSD FRML MDRD: 76 ML/MIN/1.73
GLUCOSE SERPL-MCNC: 104 MG/DL (ref 65–99)
HCT VFR BLD AUTO: 37.5 % (ref 34–46.6)
HGB BLD-MCNC: 11.6 G/DL (ref 12–15.9)
MCH RBC QN AUTO: 29.6 PG (ref 26.6–33)
MCHC RBC AUTO-ENTMCNC: 30.9 G/DL (ref 31.5–35.7)
MCV RBC AUTO: 95.7 FL (ref 79–97)
PLATELET # BLD AUTO: 266 10*3/MM3 (ref 140–450)
PMV BLD AUTO: 10.5 FL (ref 6–12)
POTASSIUM SERPL-SCNC: 3.6 MMOL/L (ref 3.5–5.2)
RBC # BLD AUTO: 3.92 10*6/MM3 (ref 3.77–5.28)
SODIUM SERPL-SCNC: 142 MMOL/L (ref 136–145)
WBC # BLD AUTO: 7.52 10*3/MM3 (ref 3.4–10.8)

## 2021-09-02 PROCEDURE — 80048 BASIC METABOLIC PNL TOTAL CA: CPT | Performed by: NURSE PRACTITIONER

## 2021-09-02 PROCEDURE — 36415 COLL VENOUS BLD VENIPUNCTURE: CPT

## 2021-09-02 PROCEDURE — 85027 COMPLETE CBC AUTOMATED: CPT | Performed by: NURSE PRACTITIONER

## 2021-09-02 PROCEDURE — 99214 OFFICE O/P EST MOD 30 MIN: CPT | Performed by: NURSE PRACTITIONER

## 2021-09-02 RX ORDER — CLOPIDOGREL BISULFATE 75 MG/1
75 TABLET ORAL DAILY
Qty: 30 TABLET | Refills: 11 | Status: SHIPPED | OUTPATIENT
Start: 2021-09-02 | End: 2021-10-05

## 2021-09-02 NOTE — PATIENT INSTRUCTIONS
"Premature Atrial Contraction    A premature atrial contraction (PAC) is a kind of irregular heartbeat (arrhythmia). It happens when the heart beats too early and then pauses before beating again.  The heart has four areas, or chambers. Normally, electrical signals spread across the heart and make all the chambers beat together. During a PAC, the upper chambers of the heart (atria) beat too early, before they have had time to fill with blood. The heartbeat pauses afterward so the heart can fill with blood for the next beat.  Sometimes PAC can be a warning sign of another type of arrhythmia called atrial fibrillation. Atrial fibrillation may allow blood to pool in the atria and form clots. If a clot travels to the brain, it can cause a stroke.  What are the causes?  The cause of this condition is often unknown. Sometimes, this condition may be caused by heart disease or injury to the heart.  What increases the risk?  You are more likely to develop this condition if:  · You are a child.  · You are an adult who is 50 years of age or older.  Episodes may be triggered by:  · Caffeine.  · Alcohol.  · Tobacco use.  · Stimulant drugs.  · Some medicines or supplements.  · Stress.  · Heart disease.  What are the signs or symptoms?  Symptoms of this condition include:  · A feeling that your heart skipped a beat. The first heartbeat after the \"skipped\" beat may feel more forceful.  · A feeling that your heart is fluttering.  How is this diagnosed?  This condition is diagnosed based on:  · Your symptoms.  · A physical exam. Your health care provider may listen to your heart.  · An electrocardiogram (ECG). This is a test that records the electrical impulses of the heart.  · An ambulatory cardiac monitor. This device records your heartbeats for 24 hours or more.  You may also have:  · An echocardiogram to check for any heart conditions. This is a type of imaging test that uses sound waves (ultrasound) to make images of your " "heart.  · Blood tests.  How is this treated?  Treatment depends on the frequency of your symptoms and other risk factors. Treatments may include:  · Medicines (beta-blockers).  · Catheter ablation. This is done to destroy the part of the heart tissue that sends abnormal signals.  In some cases, treatment may not be needed for this condition.  Follow these instructions at home:  Lifestyle  · Do not use any products that contain nicotine or tobacco, such as cigarettes, e-cigarettes, and chewing tobacco. If you need help quitting, ask your health care provider.  · Exercise regularly. Ask your health care provider what type of exercise is safe for you.  · Find healthy ways to manage stress.  · Try to get at least 7-9 hours of sleep each night, or as much as recommended by your health care provider.  Alcohol use  · Do not drink alcohol if:  ? Your health care provider tells you not to drink.  ? You are pregnant, may be pregnant, or are planning to become pregnant.  ? Alcohol triggers your episodes.  · If you drink alcohol:  ? Limit how much you use to:  § 0-1 drink a day for women.  § 0-2 drinks a day for men.  ? Be aware of how much alcohol is in your drink. In the [place] one drink equals one 12 oz bottle of beer (355 mL), one 5 oz glass of wine (148 mL), or one 1½ oz glass of hard liquor (44 mL).  General instructions  · Take over-the-counter and prescription medicines only as told by your health care provider.  · If caffeine triggers episodes, do not eat, drink, or use anything with caffeine in it.  · Keep all follow-up visits as told by your health care provider. This is important.  Contact a health care provider if:  · You feel your heart skipping beats.  · Your heart skips beats and you feel dizzy, light-headed, or very tired.  Get help right away if you have:  · Chest pain.  · Trouble breathing.  · Any symptoms of a stroke. \"BE FAST\" is an easy way to remember the main warning signs of a stroke.  ? B - Balance. " "Signs are dizziness, sudden trouble walking, or loss of balance.  ? E - Eyes. Signs are trouble seeing or a sudden change in vision.  ? F - Face. Signs are sudden weakness or numbness of the face, or the face or eyelid drooping on one side.  ? A - Arms. Signs are weakness or numbness in an arm. This happens suddenly and usually on one side of the body.  ? S - Speech. Signs are sudden trouble speaking, slurred speech, or trouble understanding what people say.  ? T - Time. Time to call emergency services. Write down what time symptoms started.  · Other signs of stroke, such as:  ? A sudden, severe headache with no known cause.  ? Nausea or vomiting.  ? Seizure.  These symptoms may represent a serious problem that is an emergency. Do not wait to see if the symptoms will go away. Get medical help right away. Call your local emergency services (911 in the [place] Do not drive yourself to the hospital.  Summary  · A premature atrial contraction (PAC) is a kind of irregular heartbeat (arrhythmia). It happens when the heart beats too early and then pauses before beating again.  · Treatment depends on your symptoms and whether you have other underlying heart conditions.  · Contact a health care provider if your heart skips beats and you feel dizzy, light-headed, or very tired.  · In some cases, this condition may lead to a stroke. \"BE FAST\" is an easy way to remember the warning signs of stroke. Get help right away if you have any of the \"BE FAST\" signs.  This information is not intended to replace advice given to you by your health care provider. Make sure you discuss any questions you have with your health care provider.  Document Revised: 09/12/2019 Document Reviewed: 09/12/2019  [employer] Patient Education © 2021 [employer]  Premature Ventricular Contraction    A premature ventricular contraction (PVC) is a common kind of irregular heartbeat (arrhythmia). These contractions are extra heartbeats that start in the " ventricles of the heart and occur too early in the normal sequence. During the PVC, the heart's normal electrical pathway is not used, so the beat is shorter and less effective. In most cases, these contractions come and go and do not require treatment.  What are the causes?  Common causes of the condition include:  · Smoking.  · Drinking alcohol.  · Certain medicines.  · Some illegal drugs.  · Stress.  · Caffeine.  Certain medical conditions can also cause PVCs:  · Heart failure.  · Heart attack, or coronary artery disease.  · Heart valve problems.  · Changes in minerals in the blood (electrolytes).  · Low blood oxygen levels or high carbon dioxide levels.  In many cases, the cause of this condition is not known.  What are the signs or symptoms?  The main symptom of this condition is fast or skipped heartbeats (palpitations). Other symptoms include:  · Chest pain.  · Shortness of breath.  · Feeling tired.  · Dizziness.  · Difficulty exercising.  In some cases, there are no symptoms.  How is this diagnosed?  This condition may be diagnosed based on:  · Your medical history.  · A physical exam. During the exam, the health care provider will check for irregular heartbeats.  · Tests, such as:  ? An ECG (electrocardiogram) to monitor the electrical activity of your heart.  ? An ambulatory cardiac monitor. This device records your heartbeats for 24 hours or more.  ? Stress tests to see how exercise affects your heart rhythm and blood supply.  ? An echocardiogram. This test uses sound waves (ultrasound) to produce an image of your heart.  ? An electrophysiology study (EPS). This test checks for electrical problems in your heart.  How is this treated?  Treatment for this condition depends on any underlying conditions, the type of PVCs that you are having, and how much the symptoms are interfering with your daily life.  Possible treatments include:  · Avoiding things that cause premature contractions (triggers). These  include caffeine and alcohol.  · Taking medicines if symptoms are severe or if the extra heartbeats are frequent.  · Getting treatment for underlying conditions that cause PVCs.  · Having an implantable cardioverter defibrillator (ICD), if you are at risk for a serious arrhythmia. The ICD is a small device that is inserted into your chest to monitor your heartbeat. When it senses an irregular heartbeat, it sends a shock to bring the heartbeat back to normal.  · Having a procedure to destroy the portion of the heart tissue that sends out abnormal signals (catheter ablation).  In some cases, no treatment is required.  Follow these instructions at home:  Lifestyle  · Do not use any products that contain nicotine or tobacco, such as cigarettes, e-cigarettes, and chewing tobacco. If you need help quitting, ask your health care provider.  · Do not use illegal drugs.  · Exercise regularly. Ask your health care provider what type of exercise is safe for you.  · Try to get at least 7-9 hours of sleep each night, or as much as recommended by your health care provider.  · Find healthy ways to manage stress. Avoid stressful situations when possible.  Alcohol use  · Do not drink alcohol if:  ? Your health care provider tells you not to drink.  ? You are pregnant, may be pregnant, or are planning to become pregnant.  ? Alcohol triggers your episodes.  · If you drink alcohol:  ? Limit how much you use to:  § 0-1 drink a day for women.  § 0-2 drinks a day for men.  · Be aware of how much alcohol is in your drink. In the [place] one drink equals one 12 oz bottle of beer (355 mL), one 5 oz glass of wine (148 mL), or one 1½ oz glass of hard liquor (44 mL).  General instructions  · Take over-the-counter and prescription medicines only as told by your health care provider.  · If caffeine triggers episodes of PVC, do not eat, drink, or use anything with caffeine in it.  · Keep all follow-up visits as told by your health care provider. This  is important.  Contact a health care provider if you:  · Feel palpitations.  Get help right away if you:  · Have chest pain.  · Have shortness of breath.  · Have sweating for no reason.  · Have nausea and vomiting.  · Become light-headed or you faint.  Summary  · A premature ventricular contraction (PVC) is a common kind of irregular heartbeat (arrhythmia).  · In most cases, these contractions come and go and do not require treatment.  · You may need to wear an ambulatory cardiac monitor. This records your heartbeats for 24 hours or more.  · Treatment depends on any underlying conditions, the type of PVCs that you are having, and how much the symptoms are interfering with your daily life.  This information is not intended to replace advice given to you by your health care provider. Make sure you discuss any questions you have with your health care provider.  Document Revised: 09/12/2019 Document Reviewed: 09/12/2019  [employer] Patient Education © 2021 [employer]    Advance Care Planning and Advance Directives     You make decisions on a daily basis - decisions about where you want to live, your career, your home, your life. Perhaps one of the most important decisions you face is your choice for future medical care. Take time to talk with your family and your healthcare team and start planning today.  Advance Care Planning is a process that can help you:  · Understand possible future healthcare decisions in light of your own experiences  · Reflect on those decision in light of your goals and values  · Discuss your decisions with those closest to you and the healthcare professionals that care for you  · Make a plan by creating a document that reflects your wishes    Surrogate Decision Maker  In the event of a medical emergency, which has left you unable to communicate or to make your own decisions, you would need someone to make decisions for you.  It is important to discuss your preferences for medical treatment  with this person while you are in good health.     Qualities of a surrogate decision maker:  • Willing to take on this role and responsibility  • Knows what you want for future medical care  • Willing to follow your wishes even if they don't agree with them  • Able to make difficult medical decisions under stressful circumstances    Advance Directives  These are legal documents you can create that will guide your healthcare team and decision maker(s) when needed. These documents can be stored in the electronic medical record.    · Living Will - a legal document to guide your care if you have a terminal condition or a serious illness and are unable to communicate. States vary by statute in document names/types, but most forms may include one or more of the following:        -  Directions regarding life-prolonging treatments        -  Directions regarding artificially provided nutrition/hydration        -  Choosing a healthcare decision maker        -  Direction regarding organ/tissue donation    · Durable Power of  for Healthcare - this document names an -in-fact to make medical decisions for you, but it may also allow this person to make personal and financial decisions for you. Please seek the advice of an  if you need this type of document.    **Advance Directives are not required and no one may discriminate against you if you do not sign one.    Medical Orders  Many states allow specific forms/orders signed by your physician to record your wishes for medical treatment in your current state of health. This form, signed in personal communication with your physician, addresses resuscitation and other medical interventions that you may or may not want.      For more information or to schedule a time with a [facility] Advance Care Planning Facilitator contact: [facility]/ACP or call [phone] and someone will contact you directly.  Coronary Angiogram With Stent  Coronary angiogram  with stent placement is a procedure to widen or open a narrow blood vessel of the heart (coronary artery). Arteries may become blocked by cholesterol buildup (plaques) in the lining of the artery wall. When a coronary artery becomes partially blocked, blood flow to that area decreases. This may lead to chest pain or a heart attack (myocardial infarction).  A stent is a small piece of metal that looks like mesh or spring. Stent placement may be done as treatment after a heart attack, or to prevent a heart attack if a blocked artery is found by a coronary angiogram.  Let your health care provider know about:  · Any allergies you have, including allergies to medicines or contrast dye.  · All medicines you are taking, including vitamins, herbs, eye drops, creams, and over-the-counter medicines.  · Any problems you or family members have had with anesthetic medicines.  · Any blood disorders you have.  · Any surgeries you have had.  · Any medical conditions you have, including kidney problems or kidney failure.  · Whether you are pregnant or may be pregnant.  · Whether you are breastfeeding.  What are the risks?  Generally, this is a safe procedure. However, serious problems may occur, including:  · Damage to nearby structures or organs, such as the heart, blood vessels, or kidneys.  · A return of blockage.  · Bleeding, infection, or bruising at the insertion site.  · A collection of blood under the skin (hematoma) at the insertion site.  · A blood clot in another part of the body.  · Allergic reaction to medicines or dyes.  · Bleeding into the abdomen (retroperitoneal bleeding).  · Stroke (rare).  · Heart attack (rare).  What happens before the procedure?  Staying hydrated  Follow instructions from your health care provider about hydration, which may include:  · Up to 2 hours before the procedure - you may continue to drink clear liquids, such as water, clear fruit juice, black coffee, and plain tea.    Eating and  drinking restrictions  Follow instructions from your health care provider about eating and drinking, which may include:  · 8 hours before the procedure - stop eating heavy meals or foods, such as meat, fried foods, or fatty foods.  · 6 hours before the procedure - stop eating light meals or foods, such as toast or cereal.  · 2 hours before the procedure - stop drinking clear liquids.  Medicines  Ask your health care provider about:  · Changing or stopping your regular medicines. This is especially important if you are taking diabetes medicines or blood thinners.  · Taking medicines such as aspirin and ibuprofen. These medicines can thin your blood. Do not take these medicines unless your health care provider tells you to take them.  ? Generally, aspirin is recommended before a thin tube, called a catheter, is passed through a blood vessel and inserted into the heart (cardiac catheterization).  · Taking over-the-counter medicines, vitamins, herbs, and supplements.  General instructions  · Do not use any products that contain nicotine or tobacco for at least 4 weeks before the procedure. These products include cigarettes, e-cigarettes, and chewing tobacco. If you need help quitting, ask your health care provider.  · Plan to have someone take you home from the hospital or clinic.  · If you will be going home right after the procedure, plan to have someone with you for 24 hours.  · You may have tests and imaging procedures.  · Ask your health care provider:  ? How your insertion site will be marked. Ask which artery will be used for the procedure.  ? What steps will be taken to help prevent infection. These may include:  § Removing hair at the insertion site.  § Washing skin with a germ-killing soap.  § Taking antibiotic medicine.  What happens during the procedure?    · An IV will be inserted into one of your veins.  · Electrodes may be placed on your chest to monitor your heart rate during the procedure.  · You will  be given one or more of the following:  ? A medicine to help you relax (sedative).  ? A medicine to numb the area (local anesthetic) for catheter insertion.  · A small incision will be made for catheter insertion.  · The catheter will be inserted into an artery using a guide wire. The location may be in your groin, your wrist, or the fold of your arm (near your elbow).  · An X-ray procedure (fluoroscopy) will be used to help guide the catheter to the opening of the heart arteries.  · A dye will be injected into the catheter. X-rays will be taken. The dye helps to show where any narrowing or blockages are located in the arteries.  · Tell your health care provider if you have chest pain or trouble breathing.  · A tiny wire will be guided to the blocked spot, and a balloon will be inflated to make the artery wider.  · The stent will be expanded to crush the plaques into the wall of the vessel. The stent will hold the area open and improve the blood flow. Most stents have a drug coating to reduce the risk of the stent narrowing over time.  · The artery may be made wider using a drill, laser, or other tools that remove plaques.  · The catheter will be removed when the blood flow improves. The stent will stay where it was placed, and the lining of the artery will grow over it.  · A bandage (dressing) will be placed on the insertion site. Pressure will be applied to stop bleeding.  · The IV will be removed.  This procedure may vary among health care providers and hospitals.  What happens after the procedure?  · Your blood pressure, heart rate, breathing rate, and blood oxygen level will be monitored until you leave the hospital or clinic.  · If the procedure is done through the leg, you will lie flat in bed for a few hours or for as long as told by your health care provider. You will be instructed not to bend or cross your legs.  · The insertion site and the pulse in your foot or wrist will be checked often.  · You may  have more blood tests, X-rays, and a test that records the electrical activity of your heart (electrocardiogram, or ECG).  · Do not drive for 24 hours if you were given a sedative during your procedure.  Summary  · Coronary angiogram with stent placement is a procedure to widen or open a narrowed coronary artery. This is done to treat heart problems.  · Before the procedure, let your health care provider know about all the medical conditions and surgeries you have or have had.  · This is a safe procedure. However, some problems may occur, including damage to nearby structures or organs, bleeding, blood clots, or allergies.  · Follow your health care provider's instructions about eating, drinking, medicines, and other lifestyle changes, such as quitting tobacco use before the procedure.  This information is not intended to replace advice given to you by your health care provider. Make sure you discuss any questions you have with your health care provider.  Document Revised: 07/08/2020 Document Reviewed: 07/08/2020  [employer] Patient Education © 2021 [employer]

## 2021-09-02 NOTE — PROGRESS NOTES
Subjective   Charu Michael is a 71 y.o. female     Chief Complaint   Patient presents with   • Follow-up   • Chest Pain       HPI    Problem list:    1.  CAD   1.1 [facility] 2/26/2015-2030% narrowing at the junction of its proximal middle thirds of the LAD, the diagonal with scintigraphically normal, right coronary artery with angiographically normal, circumflex had minor irregularities, EF 55%, LVEDP 12-14  1.2 rest test 8/26/2021-no evidence of ischemia, post-rest EF 48%, mild global hypokinesis  2.  Hypertension  3.  Palpitations  3.1 event monitor 8/25-9/7/2021-patient is still wearing she has had episode of NSVT, PVCs, trigeminy PVCs  4.  Claudication  5.  Shortness of breath  5.1 echo 8/26/2021-EF 50%, diastolic dysfunction 1, mild MR  6.  COPD, O2 2 L nasal cannula 24/7, Dr. Terry  7.  Dizziness  8.  History of hiatal hernia with repair  9.  Family history of early CAD, Mom 57 MI    Patient is a 71-year-old female who presents today for follow-up on testing.  She has small chest pain midsternum that is a slight pressure.  She says it will radiate to her back, neck and arm at times.  She has not used her nitroglycerin.  She says it can occur anytime and she is probably had 3-4 episodes since she was here last.  Patient says whenever it happens she will get nauseated and lightheaded.  She is unsure shortness of breath because she is short of breath all the time and is on oxygen 24/7.  She denies any palpitations, fluttering, dizziness, presyncope, syncope, orthopnea or PND.  She does have some swelling which resolves at night.  She does have Lasix to use.  She says she has shortness of breath walking any distance even with her oxygen.  Patient says she does not know what happened but last winter she was able to do all of her housework with no problem.  Since then she has to sweep a little bit and rest she says it will take her 3 different rest periods to be able to get her sweeping done.  She says this is a big change  for her.  She is very concerned.    We went over stress test and event monitor thus far.  She had a 5 beat run of NSVT, PACs, trigeminy PVCs.  Patient would like to proceed with left heart cath.    Current Outpatient Medications on File Prior to Visit   Medication Sig Dispense Refill   • aspirin (aspirin) 81 MG EC tablet Take 1 tablet by mouth Daily. 30 tablet 11   • atenolol (TENORMIN) 100 MG tablet Take 100 mg by mouth Daily.     • atorvastatin (LIPITOR) 20 MG tablet Take 1 tablet by mouth Daily. 30 tablet 11   • fluticasone-salmeterol (ADVAIR) 250-50 MCG/DOSE DISKUS Inhale 2 puffs 2 (Two) Times a Day.     • furosemide (LASIX) 20 MG tablet Take 20 mg by mouth 2 (Two) Times a Day.     • isosorbide mononitrate (IMDUR) 30 MG 24 hr tablet Take 0.5 tablets by mouth Every Night. 30 tablet 11   • lisinopril (PRINIVIL,ZESTRIL) 40 MG tablet Take 40 mg by mouth Daily.     • nitroglycerin (NITROSTAT) 0.4 MG SL tablet 1 under the tongue as needed for angina, may repeat q5mins for up three doses 30 tablet 5   • omeprazole (priLOSEC) 20 MG capsule Take 20 mg by mouth Daily.     • spironolactone (ALDACTONE) 50 MG tablet Take 50 mg by mouth Daily.       No current facility-administered medications on file prior to visit.       ALLERGIES    Norco [hydrocodone-acetaminophen]    Past Medical History:   Diagnosis Date   • Acid reflux    • Arthritis    • Asthma    • COPD (chronic obstructive pulmonary disease) (CMS/[facility])    • COVID-19 vaccine administered 2021    2nd- 2021 - Moderna    • Hypertension    • Tachycardia        Social History     Socioeconomic History   • Marital status:      Spouse name: Not on file   • Number of children: Not on file   • Years of education: Not on file   • Highest education level: Not on file   Tobacco Use   • Smoking status: Former Smoker     Packs/day: 1.00     Years: 20.00     Pack years: 20.00     Types: Cigarettes     Quit date: 3/25/2013     Years since quittin.4   •  Smokeless tobacco: Never Used   Substance and Sexual Activity   • Alcohol use: Never   • Drug use: Defer   • Sexual activity: Defer       Family History   Problem Relation Age of Onset   • Heart attack Mother    • Hypertension Mother    • Hyperlipidemia Mother    • Heart attack Father    • Hypertension Father    • Hyperlipidemia Father        Review of Systems   Constitutional: Positive for chills (will get a funny feeling then get cold then sweats ). Negative for appetite change, diaphoresis, fatigue and fever.   HENT: Negative for congestion, rhinorrhea and sore throat.    Eyes: Positive for visual disturbance (reading glasses ).   Respiratory: Positive for shortness of breath (walking at any distance she uses her oxygen all the time; O2 2L NC ). Negative for cough, chest tightness and wheezing.    Cardiovascular: Positive for chest pain (small chest pain midsternum slight pressure; will rad to back, neck and arm at times; no nitro; can occur at anytime; mabye 3-4 x since here last ) and leg swelling (legs, feet and ankles the swelling goes down at nigth at times ). Negative for palpitations.   Gastrointestinal: Positive for nausea (with CP ). Negative for abdominal pain, blood in stool, constipation, diarrhea and vomiting.   Endocrine: Negative for cold intolerance and heat intolerance.   Genitourinary: Negative for difficulty urinating, dysuria, frequency, hematuria and urgency.   Musculoskeletal: Positive for arthralgias (through out the body ), back pain (lower ) and joint swelling (ankles, and knees ). Negative for neck pain.   Skin: Negative for color change, pallor, rash and wound.   Allergic/Immunologic: Negative for environmental allergies and food allergies.   Neurological: Positive for weakness (legs ) and light-headedness (with CP). Negative for dizziness, numbness and headaches.   Hematological: Does not bruise/bleed easily.   Psychiatric/Behavioral: Negative for sleep disturbance.       Objective  "  /68 (BP Location: Left arm)   Pulse 60   Temp 97.6 °F (36.4 °C)   Ht 175.3 cm (69\")   Wt 112 kg (246 lb)   SpO2 95% Comment: 2 lts  BMI 36.33 kg/m²   Vitals:    09/02/21 1504   BP: 148/68   BP Location: Left arm   Pulse: 60   Temp: 97.6 °F (36.4 °C)   SpO2: 95%   Weight: 112 kg (246 lb)   Height: 175.3 cm (69\")      Lab Results (most recent)     None        Physical Exam  Vitals reviewed.   Constitutional:       General: She is awake.      Appearance: Normal appearance. She is well-developed and well-groomed. She is obese.   HENT:      Head: Normocephalic.   Eyes:      General: Lids are normal.   Neck:      Vascular: No carotid bruit, hepatojugular reflux or JVD.   Cardiovascular:      Rate and Rhythm: Normal rate and regular rhythm.      Pulses:           Radial pulses are 2+ on the right side and 2+ on the left side.        Dorsalis pedis pulses are 2+ on the right side and 2+ on the left side.        Posterior tibial pulses are 2+ on the right side and 2+ on the left side.      Heart sounds: Normal heart sounds.   Pulmonary:      Effort: Pulmonary effort is normal.      Breath sounds: Normal air entry. Examination of the right-lower field reveals decreased breath sounds. Examination of the left-lower field reveals decreased breath sounds. Decreased breath sounds present.      Comments: O2 2L NC 24/7  Abdominal:      General: Bowel sounds are normal.      Palpations: Abdomen is soft.   Musculoskeletal:      Right lower leg: Edema (trace) present.      Left lower leg: No edema.      Comments: Uses a rolling walker    Skin:     General: Skin is warm and dry.   Neurological:      Mental Status: She is alert and oriented to person, place, and time.   Psychiatric:         Attention and Perception: Attention and perception normal.         Mood and Affect: Mood and affect normal.         Speech: Speech normal.         Behavior: Behavior normal. Behavior is cooperative.         Thought Content: Thought " content normal.         Cognition and Memory: Cognition normal.         Judgment: Judgment normal.         Procedure   Procedures         Assessment/Plan      Diagnosis Plan   1. Other chest pain  [facility]   2. Coronary artery disease involving native coronary artery of native heart with angina pectoris (CMS/HCC)  [facility]    clopidogrel (PLAVIX) 75 MG tablet   3. Essential hypertension  [facility]    Basic Metabolic Panel    CBC (No Diff)   4. Shortness of breath  [facility]   5. Peripheral edema     6. NSVT (nonsustained ventricular tachycardia) (CMS/HCC)  [facility]   7. Premature atrial complexes  [facility]   8. PVC (premature ventricular contraction)  [facility]   9. Grade I diastolic dysfunction         Return 2-4 weeks after C.    Chest pain/CAD/hypertension/shortness of breath/NSVT/PACs/PVCs/family history of early CAD (Mom 57 MI)-patient will proceed with left heart cath.  She will get a BMP and CBC today.  She will start Plavix.  She will continue her medication regimen otherwise.  She was encouraged to use nitro as needed for chest pain no resolution to go to the ER.  She will follow-up 2 to 4 weeks of heart catheter sooner if any changes.       Charu Michael  reports that she quit smoking about 8 years ago. Her smoking use included cigarettes. She has a 20.00 pack-year smoking history. She has never used smokeless tobacco..Advance Care Planning   ACP discussion was declined by the patient. Patient does not have an advance directive, declines further assistance. Patient brought in medicine list to appointment, it's been reviewed with patient and med list was updated in the chart.     Electronically signed by:

## 2021-09-13 ENCOUNTER — HOSPITAL ENCOUNTER (OUTPATIENT)
Dept: CARDIOLOGY | Facility: HOSPITAL | Age: 71
Discharge: HOME | End: 2021-09-13

## 2021-09-13 DIAGNOSIS — R42 DIZZINESS: ICD-10-CM

## 2021-09-13 DIAGNOSIS — R09.89 DECREASED PEDAL PULSES: ICD-10-CM

## 2021-09-13 DIAGNOSIS — I73.9 CLAUDICATION (HCC): ICD-10-CM

## 2021-09-13 PROCEDURE — 93925 LOWER EXTREMITY STUDY: CPT

## 2021-09-13 PROCEDURE — 93880 EXTRACRANIAL BILAT STUDY: CPT | Performed by: INTERNAL MEDICINE

## 2021-09-13 PROCEDURE — 93925 LOWER EXTREMITY STUDY: CPT | Performed by: INTERNAL MEDICINE

## 2021-09-13 PROCEDURE — 93880 EXTRACRANIAL BILAT STUDY: CPT

## 2021-09-17 LAB
BH CV ECHO MEAS - BSA(HAYCOCK): 2.4 M^2
BH CV ECHO MEAS - BSA: 2.3 M^2
BH CV ECHO MEAS - BZI_BMI: 36.3 KILOGRAMS/M^2
BH CV ECHO MEAS - BZI_METRIC_HEIGHT: 175.3 CM
BH CV ECHO MEAS - BZI_METRIC_WEIGHT: 111.6 KG
BH CV XLRA MEAS LEFT BULB EDV: -15.7 CM/SEC
BH CV XLRA MEAS LEFT BULB PSV: -57.5 CM/SEC
BH CV XLRA MEAS LEFT CCA RATIO VEL: -70.7 CM/SEC
BH CV XLRA MEAS LEFT DIST CCA EDV: -18.7 CM/SEC
BH CV XLRA MEAS LEFT DIST CCA PSV: -71.2 CM/SEC
BH CV XLRA MEAS LEFT DIST ICA EDV: -44.9 CM/SEC
BH CV XLRA MEAS LEFT DIST ICA PSV: -128.6 CM/SEC
BH CV XLRA MEAS LEFT ICA RATIO VEL: -128 CM/SEC
BH CV XLRA MEAS LEFT ICA/CCA RATIO: 1.8
BH CV XLRA MEAS LEFT MID ICA EDV: -40.6 CM/SEC
BH CV XLRA MEAS LEFT MID ICA PSV: -121.9 CM/SEC
BH CV XLRA MEAS LEFT PROX CCA EDV: 25 CM/SEC
BH CV XLRA MEAS LEFT PROX CCA PSV: 81 CM/SEC
BH CV XLRA MEAS LEFT PROX ECA EDV: -22 CM/SEC
BH CV XLRA MEAS LEFT PROX ECA PSV: -118.6 CM/SEC
BH CV XLRA MEAS LEFT PROX ICA EDV: 46.5 CM/SEC
BH CV XLRA MEAS LEFT PROX ICA PSV: 105.6 CM/SEC
BH CV XLRA MEAS LEFT VERTEBRAL A EDV: 21.6 CM/SEC
BH CV XLRA MEAS LEFT VERTEBRAL A PSV: 75.1 CM/SEC
BH CV XLRA MEAS RIGHT BULB EDV: -11 CM/SEC
BH CV XLRA MEAS RIGHT BULB PSV: -43 CM/SEC
BH CV XLRA MEAS RIGHT CCA RATIO VEL: -62.9 CM/SEC
BH CV XLRA MEAS RIGHT DIST CCA EDV: -11 CM/SEC
BH CV XLRA MEAS RIGHT DIST CCA PSV: -63.4 CM/SEC
BH CV XLRA MEAS RIGHT DIST ICA EDV: -24.6 CM/SEC
BH CV XLRA MEAS RIGHT DIST ICA PSV: -81 CM/SEC
BH CV XLRA MEAS RIGHT ICA RATIO VEL: -95.3 CM/SEC
BH CV XLRA MEAS RIGHT ICA/CCA RATIO: 1.5
BH CV XLRA MEAS RIGHT MID ICA EDV: -28.5 CM/SEC
BH CV XLRA MEAS RIGHT MID ICA PSV: -95.8 CM/SEC
BH CV XLRA MEAS RIGHT PROX CCA EDV: 15.4 CM/SEC
BH CV XLRA MEAS RIGHT PROX CCA PSV: 81.1 CM/SEC
BH CV XLRA MEAS RIGHT PROX ECA EDV: -20.3 CM/SEC
BH CV XLRA MEAS RIGHT PROX ECA PSV: -116.6 CM/SEC
BH CV XLRA MEAS RIGHT PROX ICA EDV: -27.9 CM/SEC
BH CV XLRA MEAS RIGHT PROX ICA PSV: -80.3 CM/SEC
BH CV XLRA MEAS RIGHT VERTEBRAL A EDV: 18.9 CM/SEC
BH CV XLRA MEAS RIGHT VERTEBRAL A PSV: 58.5 CM/SEC

## 2021-09-18 LAB
BH CV ECHO MEAS - BSA(HAYCOCK): 2.4 M^2
BH CV ECHO MEAS - BSA: 2.3 M^2
BH CV ECHO MEAS - BZI_BMI: 36.3 KILOGRAMS/M^2
BH CV ECHO MEAS - BZI_METRIC_HEIGHT: 175.3 CM
BH CV ECHO MEAS - BZI_METRIC_WEIGHT: 111.6 KG
BH CV LEA LEFT ANT TIBIAL A DISTAL PSV: 23 CM/S
BH CV LEA LEFT CFA PROX PSV: 118 CM/S
BH CV LEA LEFT DFA PROX PSV: 43 CM/S
BH CV LEA LEFT POPITEAL A  PROX PSV: 41 CM/S
BH CV LEA LEFT PTA DISTAL PSV: 34 CM/S
BH CV LEA LEFT SFA MID PSV: 64 CM/S
BH CV LEA LEFT SFA PROX PSV: 74 CM/S
BH CV LEA RIGHT ANT TIBIAL A DISTAL PSV: 53 CM/S
BH CV LEA RIGHT CFA PROX PSV: 138 CM/S
BH CV LEA RIGHT DFA PROX PSV: 156 CM/S
BH CV LEA RIGHT POPITEAL A  PROX PSV: 87 CM/S
BH CV LEA RIGHT PTA DISTAL PSV: 82 CM/S
BH CV LEA RIGHT SFA DISTAL PSV: 115 CM/S
BH CV LEA RIGHT SFA MID PSV: 109 CM/S
BH CV LEA RIGHT SFA PROX PSV: 105 CM/S
DIST ATA PSV LEFT: 22.9 CM/SEC
DIST ATA PSV RIGHT: 54.1 CM/SEC
DIST PTA PSV LEFT: 33.9 CM/SEC
DIST PTA PSV RIGHT: 82.3 CM/SEC
DIST SFA PSV RIGHT: -115.9 CM/SEC
LEFT CFA PROX SYS PSV: 118.8 CM/SEC
MAXIMAL PREDICTED HEART RATE: 150 BPM
MID SFA PSV LEFT: -64.8 CM/SEC
MID SFA PSV RIGHT: -109 CM/SEC
PROX PFA PSV LEFT: -42.5 CM/SEC
PROX PFA PSV RIGHT: -156 CM/SEC
PROX SFA PSV LEFT: -73.5 CM/SEC
PROX SFA PSV RIGHT: -105 CM/SEC
RIGHT CFA PROX SYS PSV: 138 CM/SEC
STRESS TARGET HR: 128 BPM

## 2021-09-19 NOTE — PROGRESS NOTES
Please advise patient.  On ASA and statin.  Re: thyroid nodule, patient needs to f/u with PCP.  I have forwarded results.

## 2021-09-20 ENCOUNTER — TELEPHONE (OUTPATIENT)
Dept: CARDIOLOGY | Facility: CLINIC | Age: 71
End: 2021-09-20

## 2021-09-20 NOTE — TELEPHONE ENCOUNTER
----- Message from RIGO Reich sent at 9/19/2021  2:58 PM EDT -----  Please advise patient.  On ASA and statin.  Re: thyroid nodule, patient needs to f/u with PCP.  I have forwarded results.

## 2021-09-20 NOTE — TELEPHONE ENCOUNTER
Unable to leave mess regarding test results : Pt needs to continue taking asa and statins per RIGO Beckman    f/up apt is 10/15/2021 @ 10:15 am   Pet RIGO Beckman pt will need to f/up with PCP regarding the Thyroid nodule results sent to PCP        Nonobstructive carotid disease bilaterally as detailed above.  Antegrade flow in both vertebral arteries.  An incidental finding is a 2 x 3 cm nodule in the left lobe of the thyroid.  Further evaluation of thyroid findings will be deferred to the patient's primary providers.    Echo results : There is eccentric concentric plaque on the right resulting in less than 50% stenosis and there is fibrotic segmental disease on the left with 50% or lesser stenosis.  There is segmental 50% stenosis in the proximal right profunda artery.

## 2021-09-20 NOTE — TELEPHONE ENCOUNTER
Spoke with pts  and notified him of irregular test results, and to continue ASA and statin. He verbally confirmed he understood, and that PCP will be for follow up on this.

## 2021-09-22 ENCOUNTER — OUTSIDE FACILITY SERVICE (OUTPATIENT)
Dept: CARDIOLOGY | Facility: CLINIC | Age: 71
End: 2021-09-22

## 2021-09-28 ENCOUNTER — OUTSIDE FACILITY SERVICE (OUTPATIENT)
Dept: CARDIOLOGY | Facility: CLINIC | Age: 71
End: 2021-09-28

## 2021-09-28 PROCEDURE — 93458 L HRT ARTERY/VENTRICLE ANGIO: CPT | Performed by: INTERNAL MEDICINE

## 2021-10-05 ENCOUNTER — TELEPHONE (OUTPATIENT)
Dept: CARDIOLOGY | Facility: CLINIC | Age: 71
End: 2021-10-05

## 2021-10-05 ENCOUNTER — OFFICE VISIT (OUTPATIENT)
Dept: CARDIOLOGY | Facility: CLINIC | Age: 71
End: 2021-10-05

## 2021-10-05 VITALS
BODY MASS INDEX: 35.59 KG/M2 | WEIGHT: 241 LBS | TEMPERATURE: 97.3 F | SYSTOLIC BLOOD PRESSURE: 114 MMHG | DIASTOLIC BLOOD PRESSURE: 64 MMHG | OXYGEN SATURATION: 93 % | HEART RATE: 88 BPM

## 2021-10-05 DIAGNOSIS — I65.23 BILATERAL CAROTID ARTERY STENOSIS: ICD-10-CM

## 2021-10-05 DIAGNOSIS — I73.9 ASYMPTOMATIC PVD (PERIPHERAL VASCULAR DISEASE) (HCC): ICD-10-CM

## 2021-10-05 DIAGNOSIS — I25.119 CORONARY ARTERY DISEASE INVOLVING NATIVE CORONARY ARTERY OF NATIVE HEART WITH ANGINA PECTORIS (HCC): Primary | ICD-10-CM

## 2021-10-05 DIAGNOSIS — I49.1 PREMATURE ATRIAL COMPLEXES: ICD-10-CM

## 2021-10-05 DIAGNOSIS — R07.89 CHEST TIGHTNESS: ICD-10-CM

## 2021-10-05 DIAGNOSIS — Z82.49 FAMILY HISTORY OF EARLY CAD: ICD-10-CM

## 2021-10-05 DIAGNOSIS — I51.89 GRADE I DIASTOLIC DYSFUNCTION: ICD-10-CM

## 2021-10-05 DIAGNOSIS — R00.2 PALPITATIONS: ICD-10-CM

## 2021-10-05 DIAGNOSIS — I47.29 NSVT (NONSUSTAINED VENTRICULAR TACHYCARDIA) (HCC): ICD-10-CM

## 2021-10-05 DIAGNOSIS — R60.9 PERIPHERAL EDEMA: ICD-10-CM

## 2021-10-05 DIAGNOSIS — R06.02 SHORTNESS OF BREATH: ICD-10-CM

## 2021-10-05 DIAGNOSIS — R07.89 OTHER CHEST PAIN: ICD-10-CM

## 2021-10-05 DIAGNOSIS — I49.3 PVC (PREMATURE VENTRICULAR CONTRACTION): ICD-10-CM

## 2021-10-05 DIAGNOSIS — I10 ESSENTIAL HYPERTENSION: ICD-10-CM

## 2021-10-05 PROCEDURE — 99214 OFFICE O/P EST MOD 30 MIN: CPT | Performed by: NURSE PRACTITIONER

## 2021-10-05 NOTE — PROGRESS NOTES
Subjective   Charu Michael is a 71 y.o. female     Chief Complaint   Patient presents with   • Follow-up   • Chest Pain       HPI    Problem list:    1.  CAD   1.1 [facility] 2/26/2015-2030% narrowing at the junction of its proximal middle thirds of the LAD, the diagonal with scintigraphically normal, right coronary artery with angiographically normal, circumflex had minor irregularities, EF 55%, LVEDP 12-14  1.2 Stress test 8/26/2021-no evidence of ischemia, post-rest EF 48%, mild global hypokinesis  1.3 [facility] 9/28/2021-minor irregularities in the circumflex, 20% stenosis in the LAD, right coronary artery 20 to 40% stenosis, EF 45 to 50%, LVEDP 20  2.  Hypertension  3.  Palpitations  3.1 event monitor 8/25-9/7/2021-patient is still wearing she has had episode of NSVT, PVCs, trigeminy PVCs  4.  PVD  4.1 bilateral lower extremity arterial ultrasound 9/13/2021-femoral artery right and left less than 50%, 24 9% stenosis mid and distal right superficial femoral artery, popliteal arteries were patent, distal vessels were patent, diffuse but no high-grade stenosis bilaterally  5.  Shortness of breath  5.1 echo 8/26/2021-EF 50%, diastolic dysfunction 1, mild MR  6.  COPD, O2 2 L nasal cannula 24/7, Dr. Terry  7.  Dizziness  8.  History of hiatal hernia with repair  9.  Family history of early CAD, Mom 57 MI  10.  Carotid artery disease  10.1 carotid artery ultrasound 9/13/2021-16 to 49% stenosis of internal carotid artery, 16 to 49% stenosis left internal carotid artery, coloration most likely due to vessel tortuosity, antegrade flow both vertebral arteries, two x 3 cm nodule in the left lobe of the thyroid    Patient is a 71-year-old female who presents today for follow-up status post left heart cath with her  at her side.  She says she does have chest tightness which occurs mostly after she has become short of breath.  She says this can occur anytime.  She does have fluttering she says maybe once a week.  She says it really  just depends on what she is doing.  She denies any dizziness, presyncope, syncope, orthopnea or PND.  She does have lightheadedness at times when she gets up quickly she says she will be off balance.  Patient says she has swelling in her legs that sometimes goes down overnight and other times not so much.  Patient says she has shortness of breath walking any distance.  She does wear oxygen 24/7.  Patient's right radial site is unremarkable.    We went over left heart cath, bilateral extremity arterial ultrasound and carotid artery ultrasound.  Patient was just concerned because her mother and father both  from sudden cardiac death but she does not know exactly what happened.    PCP is referring her to endocrinology for thyroid nodule    Current Outpatient Medications on File Prior to Visit   Medication Sig Dispense Refill   • aspirin (aspirin) 81 MG EC tablet Take 1 tablet by mouth Daily. 30 tablet 11   • atenolol (TENORMIN) 100 MG tablet Take 100 mg by mouth Daily.     • atorvastatin (LIPITOR) 20 MG tablet Take 1 tablet by mouth Daily. 30 tablet 11   • fluticasone-salmeterol (ADVAIR) 250-50 MCG/DOSE DISKUS Inhale 2 puffs 2 (Two) Times a Day.     • furosemide (LASIX) 20 MG tablet Take 20 mg by mouth 2 (Two) Times a Day.     • isosorbide mononitrate (IMDUR) 30 MG 24 hr tablet Take 0.5 tablets by mouth Every Night. 30 tablet 11   • lisinopril (PRINIVIL,ZESTRIL) 40 MG tablet Take 40 mg by mouth Daily.     • nitroglycerin (NITROSTAT) 0.4 MG SL tablet 1 under the tongue as needed for angina, may repeat q5mins for up three doses 30 tablet 5   • omeprazole (priLOSEC) 20 MG capsule Take 20 mg by mouth Daily.     • spironolactone (ALDACTONE) 50 MG tablet Take 50 mg by mouth Daily.     • [DISCONTINUED] clopidogrel (PLAVIX) 75 MG tablet Take 1 tablet by mouth Daily. 30 tablet 11     No current facility-administered medications on file prior to visit.       ALLERGIES    Norco [hydrocodone-acetaminophen]    Past Medical  History:   Diagnosis Date   • Acid reflux    • Arthritis    • Asthma    • COPD (chronic obstructive pulmonary disease) ([facility])    • COVID-19 vaccine administered 2021    2nd- 2021 - Moderna    • Hypertension    • Tachycardia        Social History     Socioeconomic History   • Marital status:      Spouse name: Not on file   • Number of children: Not on file   • Years of education: Not on file   • Highest education level: Not on file   Tobacco Use   • Smoking status: Former Smoker     Packs/day: 1.00     Years: 20.00     Pack years: 20.00     Types: Cigarettes     Quit date: 3/25/2013     Years since quittin.5   • Smokeless tobacco: Never Used   Substance and Sexual Activity   • Alcohol use: Never   • Drug use: Defer   • Sexual activity: Defer       Family History   Problem Relation Age of Onset   • Heart attack Mother    • Hypertension Mother    • Hyperlipidemia Mother    • Heart attack Father    • Hypertension Father    • Hyperlipidemia Father        Review of Systems   Constitutional: Negative for appetite change, chills, diaphoresis, fatigue and fever.   HENT: Positive for congestion (head and nasal congestion ). Negative for rhinorrhea and sore throat.    Eyes: Positive for visual disturbance (reading glasses ).   Respiratory: Positive for chest tightness (center of the chest ( tightness at times ); when she gets short of breath ), shortness of breath (walking at any distance or doing things she wears oxygen all the time; O2 2L NC ) and wheezing (worse at night when she is laying down ). Negative for cough.    Cardiovascular: Positive for palpitations (once ( week at times) fluttering; varies depends on what she has done ) and leg swelling (feet and ankles ( swelling will at times go down other times not ); uses water pill daily). Negative for chest pain.   Gastrointestinal: Positive for diarrhea (all the time she has H/O of diarrhea ). Negative for abdominal pain, blood in stool,  constipation, nausea and vomiting.   Endocrine: Positive for cold intolerance (cold sweats ). Negative for heat intolerance.   Genitourinary: Negative for difficulty urinating, dysuria, frequency, hematuria and urgency.   Musculoskeletal: Positive for arthralgias (her entire body ), back pain (lower ), gait problem (uses a rolling walker ), joint swelling (ankles and right knee ) and neck pain (back of her neck ). Negative for neck stiffness.   Skin: Negative for color change, pallor, rash and wound.   Allergic/Immunologic: Positive for environmental allergies (seasonal ). Negative for food allergies.   Neurological: Positive for weakness (legs ), light-headedness (at times , when she gets up to walk she has to wait for a few mins to get her balance ), numbness (legs) and headaches (off and on due to allergies ). Negative for dizziness.   Hematological: Bruises/bleeds easily (Bruises and bleeds easier ).   Psychiatric/Behavioral: Positive for sleep disturbance (hard to go and hard to stays asleep she toss and turns all night ).       Objective   /64 (BP Location: Left arm)   Pulse 88   Temp 97.3 °F (36.3 °C)   Wt 109 kg (241 lb)   SpO2 93% Comment: 2 lts  BMI 35.59 kg/m²   Vitals:    10/05/21 1029   BP: 114/64   BP Location: Left arm   Pulse: 88   Temp: 97.3 °F (36.3 °C)   SpO2: 93%   Weight: 109 kg (241 lb)      Lab Results (most recent)     None        Physical Exam  Vitals reviewed.   Constitutional:       General: She is awake.      Appearance: Normal appearance. She is well-developed and well-groomed. She is obese.   HENT:      Head: Normocephalic.   Eyes:      General: Lids are normal.   Neck:      Vascular: No carotid bruit, hepatojugular reflux or JVD.   Cardiovascular:      Rate and Rhythm: Normal rate and regular rhythm.      Pulses:           Radial pulses are 2+ on the right side and 2+ on the left side.        Dorsalis pedis pulses are 2+ on the right side and 2+ on the left side.         Posterior tibial pulses are 2+ on the right side and 2+ on the left side.      Heart sounds: Normal heart sounds.   Pulmonary:      Effort: Pulmonary effort is normal.      Breath sounds: Normal air entry. Examination of the right-lower field reveals decreased breath sounds. Examination of the left-lower field reveals decreased breath sounds. Decreased breath sounds present.      Comments: O2 2L NC   Abdominal:      General: Bowel sounds are normal.      Palpations: Abdomen is soft.   Musculoskeletal:      Right lower leg: No edema.      Left lower leg: No edema.      Comments: Rolling walker    Skin:     General: Skin is warm and dry.   Neurological:      Mental Status: She is alert and oriented to person, place, and time.   Psychiatric:         Attention and Perception: Attention and perception normal.         Mood and Affect: Mood and affect normal.         Speech: Speech normal.         Behavior: Behavior normal. Behavior is cooperative.         Thought Content: Thought content normal.         Cognition and Memory: Cognition and memory normal.         Judgment: Judgment normal.         Procedure   Procedures         Assessment/Plan      Diagnosis Plan   1. Coronary artery disease involving native coronary artery of native heart with angina pectoris ([facility])     2. Essential hypertension     3. Grade I diastolic dysfunction     4. NSVT (nonsustained ventricular tachycardia) ([facility])     5. Premature atrial complexes     6. PVC (premature ventricular contraction)     7. Shortness of breath     8. Peripheral edema     9. Asymptomatic PVD (peripheral vascular disease) ([facility])     10. Bilateral carotid artery stenosis     11. Palpitations     12. Chest tightness         Return in about 6 months (around 4/5/2022).    CAD-patient's on aspirin, statin and beta.  Hypertension-patient's on atenolol and lisinopril as well as spironolactone and doing well.  Diastolic dysfunction/peripheral edema-patient is on Lasix and  spironolactone.  NSVT/PACs/PVCs/palpitations-patient's on beta-blocker and doing well.  Shortness of breath-stable.  Bilateral carotid artery disease-patient is on aspirin and statin.  PVD-patient's on aspirin and statin.  Chest tightness-patient's coronary artery disease is minimal.  This may be more related to her lungs.  She will continue her medication regimen.  She will follow-up in 6 months or sooner if any changes.  Patient can use nitro as needed for chest pain no resolution to go to the ER.       Charu Michael  reports that she quit smoking about 8 years ago. Her smoking use included cigarettes. She has a 20.00 pack-year smoking history. She has never used smokeless tobacco..Advance Care Planning   ACP discussion was declined by the patient. Patient does not have an advance directive, declines further assistance. Patient did not bring med list or medicine bottles to appointment, med list has been reviewed and updated based on patient's knowledge of their meds.     Electronically signed by:

## 2021-10-05 NOTE — PATIENT INSTRUCTIONS
Advance Care Planning and Advance Directives     You make decisions on a daily basis - decisions about where you want to live, your career, your home, your life. Perhaps one of the most important decisions you face is your choice for future medical care. Take time to talk with your family and your healthcare team and start planning today.  Advance Care Planning is a process that can help you:  · Understand possible future healthcare decisions in light of your own experiences  · Reflect on those decision in light of your goals and values  · Discuss your decisions with those closest to you and the healthcare professionals that care for you  · Make a plan by creating a document that reflects your wishes    Surrogate Decision Maker  In the event of a medical emergency, which has left you unable to communicate or to make your own decisions, you would need someone to make decisions for you.  It is important to discuss your preferences for medical treatment with this person while you are in good health.     Qualities of a surrogate decision maker:  • Willing to take on this role and responsibility  • Knows what you want for future medical care  • Willing to follow your wishes even if they don't agree with them  • Able to make difficult medical decisions under stressful circumstances    Advance Directives  These are legal documents you can create that will guide your healthcare team and decision maker(s) when needed. These documents can be stored in the electronic medical record.    · Living Will - a legal document to guide your care if you have a terminal condition or a serious illness and are unable to communicate. States vary by statute in document names/types, but most forms may include one or more of the following:        -  Directions regarding life-prolonging treatments        -  Directions regarding artificially provided nutrition/hydration        -  Choosing a healthcare decision maker        -  Direction  regarding organ/tissue donation    · Durable Power of  for Healthcare - this document names an -in-fact to make medical decisions for you, but it may also allow this person to make personal and financial decisions for you. Please seek the advice of an  if you need this type of document.    **Advance Directives are not required and no one may discriminate against you if you do not sign one.    Medical Orders  Many states allow specific forms/orders signed by your physician to record your wishes for medical treatment in your current state of health. This form, signed in personal communication with your physician, addresses resuscitation and other medical interventions that you may or may not want.        Peripheral Vascular Disease    Peripheral vascular disease (PVD) is a disease of the blood vessels that carry blood from the heart to the rest of the body. PVD is also called peripheral artery disease (PAD) or poor circulation. PVD affects most of the body. But it affects the legs and feet the most.  PVD can lead to acute limb ischemia. This happens when there is a sudden stop of blood flow to an arm or leg. This is a medical emergency.  What are the causes?  The most common cause of PVD is a buildup of a fatty substance (plaque) inside your arteries. This decreases blood flow. Plaque can break off and block blood in a smaller artery. This can lead to acute limb ischemia.  Other common causes of PVD include:  · Blood clots inside the blood vessels.  · Injuries to blood vessels.  · Irritation and swelling of blood vessels.  · Sudden tightening of the blood vessel (spasms).  What increases the risk?  · A family history of PVD.  · Medical conditions, including:  ? High cholesterol.  ? Diabetes.  ? High blood pressure.  ? Heart disease.  ? Past problems with blood clots.  ? Past injury, such as burns or a broken bone.  · Other conditions, such as:  ? Buerger's disease. This is caused by swollen or  irritated blood vessels in your hands and feet.  ? Arthritis.  ? Birth defects that affect the arteries in your legs.  ? Kidney disease.  · Using tobacco or nicotine products.  · Not getting enough exercise.  · Being very overweight (obese).  · Being 50 years old or older.  What are the signs or symptoms?  · Cramps in your butt, legs, and feet.  · Pain and weakness in your legs when you are active that goes away when you rest.  · Leg pain when at rest.  · Leg numbness, tingling, or weakness.  · Coldness in a leg or foot, especially when compared with the other leg or foot.  · Skin or hair changes. These can include:  ? Hair loss.  ? Shiny skin.  ? Pale or bluish skin.  ? Thick toenails.  · Being unable to get or keep an erection.  · Tiredness (fatigue).  · Weak pulse or no pulse in the feet.  · Wounds and sores on the toes, feet, or legs. These take longer to heal.  How is this treated?  Underlying causes are treated first. Other conditions, like diabetes, high cholesterol, and blood pressure, are also treated. Treatment may include:  · Lifestyle changes, such as:  ? Quitting smoking.  ? Getting regular exercise.  ? Having a diet low in fat and cholesterol.  ? Not drinking alcohol.  · Taking medicines, such as:  ? Blood thinners.  ? Medicines to improve blood flow.  ? Medicines to improve your blood cholesterol.  · Procedures to:  ? Open the arteries and restore blood flow.  ? Insert a small mesh tube (stent) to keep a blocked vessel open.  ? Create a new path for blood to flow to the body (peripheral bypass).  ? Remove dead tissue from a wound.  ? Remove an affected leg or arm.  Follow these instructions at home:  Medicines  · Take over-the-counter and prescription medicines only as told by your doctor.  · If you are taking blood thinners:  ? Talk with your doctor before you take any medicines that have aspirin, or NSAIDs, such as ibuprofen.  ? Take medicines exactly as told. Take them at the same time each  "day.  ? Avoid doing things that could hurt or bruise you. Take action to prevent falls.  ? Wear an alert bracelet or carry a card that shows you are taking blood thinners.  Lifestyle         · Get regular exercise. Ask your doctor about how to stay active.  · Talk with your doctor about keeping a healthy weight. If needed, ask about losing weight.  · Eat a diet that is low in fat and cholesterol. If you need help, talk with your doctor.  · Do not drink alcohol.  · Do not smoke or use any products that contain nicotine or tobacco. If you need help quitting, ask your doctor.  General instructions  · Take good care of your feet. To do this:  ? Wear shoes that fit well and feel good.  ? Check your feet often for any cuts or sores.  · Get a flu shot (influenza vaccine) each year.  · Keep all follow-up visits.  Where to find more information  · Society for Vascular Surgery: vascular.org  · American Heart Association: heart.org  · National Heart, Lung, and Blood [place]: nhlbi.nih.gov  Contact a doctor if:  · You have cramps in your legs when you walk.  · You have leg pain when you rest.  · Your leg or foot feels cold.  · Your skin changes.  · You cannot get or keep an erection.  · You have cuts or sores on your legs or feet that do not heal.  Get help right away if:  · You have sudden changes in the color and feeling of your arms or legs, such as:  ? Your arm or leg turns cold, numb, and blue.  ? Your arm or leg becomes red, warm, swollen, painful, or numb.  · You have any signs of a stroke. \"BE FAST\" is an easy way to remember the main warning signs:  ? B - Balance. Dizziness, sudden trouble walking, or loss of balance.  ? E - Eyes. Trouble seeing or a change in how you see.  ? F - Face. Sudden weakness or loss of feeling of the face. The face or eyelid may droop on one side.  ? A - Arms. Weakness or loss of feeling in an arm. This happens all of a sudden and most often on one side of the body.  ? S - Speech. Sudden " trouble speaking, slurred speech, or trouble understanding what people say.  ? T - Time. Time to call emergency services. Write down what time symptoms started.  · You have other signs of a stroke, such as:  ? A sudden, very bad headache with no known cause.  ? Feeling like you may vomit (nausea).  ? Vomiting.  ? A seizure.  · You have chest pain or trouble breathing.  These symptoms may be an emergency. Get help right away. Call your local emergency services (911 in the [place]  · Do not wait to see if the symptoms will go away.  · Do not drive yourself to the hospital.  Summary  · Peripheral vascular disease (PVD) is a disease of the blood vessels.  · PVD affects the legs and feet the most.  · Symptoms may include leg pain or leg numbness, tingling, and weakness.  · Treatment may include lifestyle changes, medicines, and procedures.  This information is not intended to replace advice given to you by your health care provider. Make sure you discuss any questions you have with your health care provider.  Document Revised: 06/21/2021 Document Reviewed: 06/21/2021  Elsevier Patient Education © 2021 [employer]  For more information or to schedule a time with a [facility] Advance Care Planning Facilitator contact: [email or web address] or call [phone] and someone will contact you directly.

## 2022-12-08 ENCOUNTER — TELEPHONE (OUTPATIENT)
Dept: CARDIOLOGY | Facility: CLINIC | Age: 72
End: 2022-12-08

## 2022-12-08 DIAGNOSIS — I49.1 PREMATURE ATRIAL COMPLEXES: ICD-10-CM

## 2022-12-08 DIAGNOSIS — I47.29 NSVT (NONSUSTAINED VENTRICULAR TACHYCARDIA): Primary | ICD-10-CM

## 2022-12-08 DIAGNOSIS — I49.3 PVC (PREMATURE VENTRICULAR CONTRACTION): ICD-10-CM

## 2022-12-08 NOTE — TELEPHONE ENCOUNTER
PER LILLIE KOLB,PAC VERBAL  PLEASE ORDER 14 DAY EVENT MONITOR, FOR BRADYCARDIA AND DIZZY. States patient is aware will be receiving.     NO NUMBERS IN PATIENTS CHART ARE REACHABLE.    PLEASE MAIL TO PATIENTS ADDRESS.

## 2022-12-09 NOTE — TELEPHONE ENCOUNTER
I have  new phone # to try. Unable to leave vm I will try again Monday so that I can verifiy address and that pt is aware [employer] will not mail out til they verify address with the pt.

## 2022-12-21 ENCOUNTER — TELEPHONE (OUTPATIENT)
Dept: CARDIOLOGY | Facility: CLINIC | Age: 72
End: 2022-12-21

## 2022-12-21 NOTE — TELEPHONE ENCOUNTER
Received transmission that Jyoti has not received any transmissions from heart monitor. S/w Pt's spouse he said she just had not put in on yet. Encourage if need help to let us know.

## 2023-01-03 ENCOUNTER — TELEPHONE (OUTPATIENT)
Dept: CARDIOLOGY | Facility: CLINIC | Age: 73
End: 2023-01-03
Payer: MEDICARE

## 2023-01-03 NOTE — TELEPHONE ENCOUNTER
S/w patient's  and he stated that she has been in and out of hospital and they took monitor off. She spent marianne in the hospital. He is sending the monitor back.

## 2023-02-01 ENCOUNTER — TELEPHONE (OUTPATIENT)
Dept: CARDIOLOGY | Facility: CLINIC | Age: 73
End: 2023-02-01
Payer: MEDICARE

## 2023-02-01 NOTE — TELEPHONE ENCOUNTER
MONITOR  Pt notified of no acute findings. Provider will discuss results at f/u. Pt reminded of appt date and time.  ----- Message from Catina Mary MA sent at 1/26/2023 10:16 AM EST -----    ----- Message -----  From: Maynor Ruiz PA  Sent: 1/24/2023   8:38 AM EST  To: Catina Mary MA    As per recommendations.  Routine follow-up otherwise.  ----- Message -----  From: Tray Cortez MD  Sent: 1/23/2023   9:29 PM EST  To: MERCEDES De Souza

## 2023-02-08 ENCOUNTER — TELEPHONE (OUTPATIENT)
Dept: CARDIOLOGY | Facility: CLINIC | Age: 73
End: 2023-02-08
Payer: MEDICARE

## 2023-02-08 NOTE — TELEPHONE ENCOUNTER
For the HUB to read to pt:       CALLED PT TO CONFIRM APPT, VM NOT SET UP. IF PT CALLS BACK PLEASE PUT CALL THROUGH, THANK YOU

## 2024-08-26 ENCOUNTER — TELEPHONE (OUTPATIENT)
Dept: CARDIOLOGY | Facility: CLINIC | Age: 74
End: 2024-08-26
Payer: MEDICARE

## 2024-08-26 NOTE — TELEPHONE ENCOUNTER
RIGO Kelsey called our office requesting MERCEDES Tierney to review the pt's cardiac monitor report their office faxed.  MERCEDES Tierney reviewed and reports SVT the PM notified Paris.  She states she is going to start the pt on Metoprolol since she does not take Atenolol.  Informed Paris Mcguire is not recommending for this pt specifically, but his preference is generally Metoprolol Tartrate.  Offered to have Maynor's PAC to contact the pt if we have a sooner opening and she stated it isn't feasible for the pt due to her husbands schedule.    Reiterated to Paris and she verbalized an understanding.

## 2024-09-13 ENCOUNTER — OFFICE VISIT (OUTPATIENT)
Dept: CARDIOLOGY | Facility: CLINIC | Age: 74
End: 2024-09-13
Payer: MEDICARE

## 2024-09-13 VITALS
HEART RATE: 69 BPM | OXYGEN SATURATION: 96 % | HEIGHT: 69 IN | DIASTOLIC BLOOD PRESSURE: 76 MMHG | SYSTOLIC BLOOD PRESSURE: 144 MMHG | BODY MASS INDEX: 34.18 KG/M2 | WEIGHT: 230.8 LBS

## 2024-09-13 DIAGNOSIS — R00.2 PALPITATIONS: Primary | ICD-10-CM

## 2024-09-13 DIAGNOSIS — R53.83 OTHER FATIGUE: ICD-10-CM

## 2024-09-13 DIAGNOSIS — R06.02 SHORTNESS OF BREATH: ICD-10-CM

## 2024-09-13 PROCEDURE — 3078F DIAST BP <80 MM HG: CPT | Performed by: PHYSICIAN ASSISTANT

## 2024-09-13 PROCEDURE — 1160F RVW MEDS BY RX/DR IN RCRD: CPT | Performed by: PHYSICIAN ASSISTANT

## 2024-09-13 PROCEDURE — 3077F SYST BP >= 140 MM HG: CPT | Performed by: PHYSICIAN ASSISTANT

## 2024-09-13 PROCEDURE — 1159F MED LIST DOCD IN RCRD: CPT | Performed by: PHYSICIAN ASSISTANT

## 2024-09-13 PROCEDURE — 99213 OFFICE O/P EST LOW 20 MIN: CPT | Performed by: PHYSICIAN ASSISTANT

## 2024-09-13 RX ORDER — METOPROLOL TARTRATE 25 MG/1
25 TABLET, FILM COATED ORAL 2 TIMES DAILY
COMMUNITY

## 2024-09-13 RX ORDER — NITROGLYCERIN 0.4 MG/1
TABLET SUBLINGUAL
Qty: 30 TABLET | Refills: 5 | Status: SHIPPED | OUTPATIENT
Start: 2024-09-13

## 2024-09-13 RX ORDER — BUMETANIDE 0.5 MG/1
0.5 TABLET ORAL DAILY
COMMUNITY

## 2024-09-13 RX ORDER — CALCIUM CARBONATE 10GR (648 MG) 648 MG/1
1000 TABLET ORAL 3 TIMES DAILY PRN
COMMUNITY

## 2024-09-13 RX ORDER — IBUPROFEN 100 MG/5ML
200 SUSPENSION, ORAL (FINAL DOSE FORM) ORAL EVERY 6 HOURS PRN
COMMUNITY

## 2024-09-13 RX ORDER — MAGNESIUM OXIDE 400 MG/1
400 TABLET ORAL DAILY
COMMUNITY

## 2024-09-20 ENCOUNTER — TELEPHONE (OUTPATIENT)
Dept: CARDIOLOGY | Facility: CLINIC | Age: 74
End: 2024-09-20
Payer: MEDICARE

## 2024-09-23 RX ORDER — METOPROLOL TARTRATE 50 MG
50 TABLET ORAL 2 TIMES DAILY
Qty: 180 TABLET | Refills: 3 | Status: SHIPPED | OUTPATIENT
Start: 2024-09-23

## 2024-09-27 ENCOUNTER — HOSPITAL ENCOUNTER (OUTPATIENT)
Dept: CARDIOLOGY | Facility: HOSPITAL | Age: 74
Discharge: HOME | End: 2024-09-27
Payer: MEDICARE

## 2024-09-27 DIAGNOSIS — R53.83 OTHER FATIGUE: ICD-10-CM

## 2024-09-27 DIAGNOSIS — R06.02 SHORTNESS OF BREATH: ICD-10-CM

## 2024-09-27 DIAGNOSIS — R00.2 PALPITATIONS: ICD-10-CM

## 2024-09-27 PROCEDURE — 93306 TTE W/DOPPLER COMPLETE: CPT

## 2024-09-30 LAB
BH CV ECHO MEAS - ACS: 1.94 CM
BH CV ECHO MEAS - AO MAX PG: 8.4 MMHG
BH CV ECHO MEAS - AO MEAN PG: 4.4 MMHG
BH CV ECHO MEAS - AO ROOT DIAM: 2.9 CM
BH CV ECHO MEAS - AO V2 MAX: 145.2 CM/SEC
BH CV ECHO MEAS - AO V2 VTI: 37.4 CM
BH CV ECHO MEAS - EDV(CUBED): 151.4 ML
BH CV ECHO MEAS - EDV(MOD-SP4): 106 ML
BH CV ECHO MEAS - EF(MOD-SP4): 51.6 %
BH CV ECHO MEAS - EF_3D-VOL: 54 %
BH CV ECHO MEAS - ESV(CUBED): 55.7 ML
BH CV ECHO MEAS - ESV(MOD-SP4): 51.3 ML
BH CV ECHO MEAS - FS: 28.3 %
BH CV ECHO MEAS - IVS/LVPW: 0.84 CM
BH CV ECHO MEAS - IVSD: 1.04 CM
BH CV ECHO MEAS - LA DIMENSION: 4.7 CM
BH CV ECHO MEAS - LAT PEAK E' VEL: 6.9 CM/SEC
BH CV ECHO MEAS - LV DIASTOLIC VOL/BSA (35-75): 48.4 CM2
BH CV ECHO MEAS - LV MASS(C)D: 241.3 GRAMS
BH CV ECHO MEAS - LV SYSTOLIC VOL/BSA (12-30): 23.4 CM2
BH CV ECHO MEAS - LVIDD: 5.3 CM
BH CV ECHO MEAS - LVIDS: 3.8 CM
BH CV ECHO MEAS - LVPWD: 1.24 CM
BH CV ECHO MEAS - MED PEAK E' VEL: 6.6 CM/SEC
BH CV ECHO MEAS - MV A MAX VEL: 124.3 CM/SEC
BH CV ECHO MEAS - MV DEC TIME: 0.22 SEC
BH CV ECHO MEAS - MV E MAX VEL: 75.9 CM/SEC
BH CV ECHO MEAS - MV E/A: 0.61
BH CV ECHO MEAS - RAP SYSTOLE: 10 MMHG
BH CV ECHO MEAS - RVDD: 3.6 CM
BH CV ECHO MEAS - RVSP: 51 MMHG
BH CV ECHO MEAS - SV(MOD-SP4): 54.7 ML
BH CV ECHO MEAS - SVI(MOD-SP4): 25 ML/M2
BH CV ECHO MEAS - TR MAX PG: 41 MMHG
BH CV ECHO MEAS - TR MAX VEL: 320.3 CM/SEC
BH CV ECHO MEASUREMENTS AVERAGE E/E' RATIO: 11.24
LEFT ATRIUM VOLUME INDEX: 24.5 ML/M2

## 2024-10-15 ENCOUNTER — TELEPHONE (OUTPATIENT)
Dept: CARDIOLOGY | Facility: CLINIC | Age: 74
End: 2024-10-15
Payer: MEDICARE

## 2024-10-15 DIAGNOSIS — G47.39 SLEEP APNEA-LIKE BEHAVIOR: Primary | ICD-10-CM

## 2024-10-15 NOTE — TELEPHONE ENCOUNTER
Pt Lvm requesting echo results.       Per chart review, results are in, but I am unable to see interp.

## 2024-10-16 NOTE — TELEPHONE ENCOUNTER
Per Maynor Ruiz PA EF 45 - 50 %, 4.  RV and PA systolic pressures are estimated at approximately 50 mmHg. Eval for sleep apnea.     Patient notified of result's and recommendation's. Referral placed for pulmonology.